# Patient Record
Sex: MALE | Race: WHITE | NOT HISPANIC OR LATINO | Employment: FULL TIME | URBAN - METROPOLITAN AREA
[De-identification: names, ages, dates, MRNs, and addresses within clinical notes are randomized per-mention and may not be internally consistent; named-entity substitution may affect disease eponyms.]

---

## 2017-08-01 ENCOUNTER — OFFICE VISIT (OUTPATIENT)
Dept: URGENT CARE | Facility: CLINIC | Age: 37
End: 2017-08-01
Payer: COMMERCIAL

## 2017-08-01 ENCOUNTER — APPOINTMENT (OUTPATIENT)
Dept: RADIOLOGY | Facility: CLINIC | Age: 37
End: 2017-08-01
Payer: COMMERCIAL

## 2017-08-01 DIAGNOSIS — S99.911A INJURY OF RIGHT ANKLE: ICD-10-CM

## 2017-08-01 PROCEDURE — S9088 SERVICES PROVIDED IN URGENT: HCPCS

## 2017-08-01 PROCEDURE — 73610 X-RAY EXAM OF ANKLE: CPT

## 2017-08-01 PROCEDURE — 99204 OFFICE O/P NEW MOD 45 MIN: CPT

## 2019-05-03 ENCOUNTER — TRANSCRIBE ORDERS (OUTPATIENT)
Dept: ADMINISTRATIVE | Facility: HOSPITAL | Age: 39
End: 2019-05-03

## 2019-05-03 DIAGNOSIS — R53.83 OTHER FATIGUE: ICD-10-CM

## 2019-05-03 DIAGNOSIS — R06.83 SNORING: Primary | ICD-10-CM

## 2019-07-03 ENCOUNTER — TELEPHONE (OUTPATIENT)
Dept: SLEEP CENTER | Facility: CLINIC | Age: 39
End: 2019-07-03

## 2019-07-03 NOTE — TELEPHONE ENCOUNTER
----- Message from Tracy Mendez MD sent at 7/2/2019  1:10 PM EDT -----  Approved  ----- Message -----  From: Enrique Solis MA  Sent: 7/1/2019   1:33 PM EDT  To: Sleep Medicine [place] Provider    This sleep study needs approval      If approved please sign and return to clerical pool  If denied please include reasons why  Also provide alternative testing if warranted  Please sign and return to clerical pool

## 2019-10-30 RX ORDER — ATENOLOL 50 MG/1
1 TABLET ORAL DAILY
COMMUNITY
End: 2019-11-15 | Stop reason: SDUPTHER

## 2019-10-30 RX ORDER — LISINOPRIL AND HYDROCHLOROTHIAZIDE 20; 12.5 MG/1; MG/1
1 TABLET ORAL DAILY
COMMUNITY
End: 2019-11-15 | Stop reason: SDUPTHER

## 2019-10-30 RX ORDER — ROSUVASTATIN CALCIUM 5 MG/1
1 TABLET, COATED ORAL DAILY
COMMUNITY
End: 2019-11-15 | Stop reason: SDUPTHER

## 2019-11-15 ENCOUNTER — OFFICE VISIT (OUTPATIENT)
Dept: FAMILY MEDICINE CLINIC | Facility: CLINIC | Age: 39
End: 2019-11-15
Payer: COMMERCIAL

## 2019-11-15 VITALS
OXYGEN SATURATION: 93 % | BODY MASS INDEX: 46.12 KG/M2 | HEIGHT: 69 IN | HEART RATE: 60 BPM | DIASTOLIC BLOOD PRESSURE: 78 MMHG | WEIGHT: 311.4 LBS | SYSTOLIC BLOOD PRESSURE: 132 MMHG

## 2019-11-15 DIAGNOSIS — I10 ESSENTIAL HYPERTENSION, BENIGN: ICD-10-CM

## 2019-11-15 DIAGNOSIS — G47.30 SLEEP APNEA, UNSPECIFIED TYPE: ICD-10-CM

## 2019-11-15 DIAGNOSIS — Z23 IMMUNIZATION DUE: Primary | ICD-10-CM

## 2019-11-15 DIAGNOSIS — Z85.820 HISTORY OF MELANOMA: ICD-10-CM

## 2019-11-15 DIAGNOSIS — E78.2 MIXED HYPERLIPIDEMIA: ICD-10-CM

## 2019-11-15 PROCEDURE — 99213 OFFICE O/P EST LOW 20 MIN: CPT | Performed by: FAMILY MEDICINE

## 2019-11-15 PROCEDURE — 1036F TOBACCO NON-USER: CPT | Performed by: FAMILY MEDICINE

## 2019-11-15 RX ORDER — EZETIMIBE 10 MG/1
10 TABLET ORAL DAILY
Qty: 30 TABLET | Refills: 5 | Status: SHIPPED | OUTPATIENT
Start: 2019-11-15 | End: 2020-06-01 | Stop reason: SDUPTHER

## 2019-11-15 RX ORDER — LISINOPRIL AND HYDROCHLOROTHIAZIDE 20; 12.5 MG/1; MG/1
1 TABLET ORAL DAILY
Qty: 30 TABLET | Refills: 5 | Status: SHIPPED | OUTPATIENT
Start: 2019-11-15 | End: 2020-06-01 | Stop reason: SDUPTHER

## 2019-11-15 RX ORDER — ROSUVASTATIN CALCIUM 5 MG/1
5 TABLET, COATED ORAL DAILY
Qty: 30 TABLET | Refills: 5 | Status: SHIPPED | OUTPATIENT
Start: 2019-11-15 | End: 2020-05-22 | Stop reason: CLARIF

## 2019-11-15 RX ORDER — ATENOLOL 50 MG/1
50 TABLET ORAL DAILY
Qty: 30 TABLET | Refills: 5 | Status: SHIPPED | OUTPATIENT
Start: 2019-11-15 | End: 2020-06-01 | Stop reason: SDUPTHER

## 2019-11-15 RX ORDER — EZETIMIBE 10 MG/1
10 TABLET ORAL DAILY
Refills: 6 | COMMUNITY
Start: 2019-10-31 | End: 2019-11-15 | Stop reason: SDUPTHER

## 2019-11-15 NOTE — PROGRESS NOTES
Assessment/Plan:    Essential hypertension, benign  Patient has hypertension which is well controlled, despite his morbid obesity  I am going to continue the patient on his current blood pressure regiment  I counseled the patient regarding weight loss  I advised him to follow a sodium restricted diet  He gained 18 lb in the past 6 months  I am very worried about this  Mixed hyperlipidemia  Patient has hyperlipidemia  He has not had blood work done in 2 years now  I gave him a prescription have fasting blood work done when he was here last   He did not have it  He needs to get his blood work done  He plans to go over this weekend  I will contact him with the results  He has history of very difficult to treat hyperlipidemia  I will consider discussing Martell Mason with him    History of melanoma  I examined the patient's back  He has a small pigmented lesion which does not appear to be a melanoma  It is not changed from his last visit 6 months ago  Sleep apnea  I suspect the patient has obstructive sleep apnea  We discussed this  I am going to refer him for a home sleep study  I referred the patient in the past for a polysomnogram but his insurance to refused  Hopefully, they will allow him to have a home study  The patient has clinical symptoms and physical signs of obstructive sleep apnea       Diagnoses and all orders for this visit:    Immunization due  -     influenza vaccine, 2286-5718, quadrivalent, 0 5 mL, preservative-free, for adult and pediatric patients 6 mos+ (AFLURIA, FLUARIX, FLULAVAL, FLUZONE)    Mixed hyperlipidemia  -     Lipid panel; Future  -     Comprehensive metabolic panel; Future  -     ezetimibe (ZETIA) 10 mg tablet; Take 1 tablet (10 mg total) by mouth daily  -     rosuvastatin (CRESTOR) 5 mg tablet; Take 1 tablet (5 mg total) by mouth daily    Sleep apnea, unspecified type  -     Home Study;  Future  -     CBC and differential; Future    Essential hypertension, benign  - atenolol (TENORMIN) 50 mg tablet; Take 1 tablet (50 mg total) by mouth daily  -     lisinopril-hydrochlorothiazide (PRINZIDE,ZESTORETIC) 20-12 5 MG per tablet; Take 1 tablet by mouth daily    History of melanoma    Other orders  -     Discontinue: ezetimibe (ZETIA) 10 mg tablet; Take 10 mg by mouth daily          Subjective:      Patient ID: Griselda Cordoba is a 46 y o  male  This patient is a 81-year-old white male presents to the office for his routine checkup  The patient never got his sleep study done  He tells me it was denied by his insurance  He tells me they denied it because he did not have enough comorbidities  He never got his blood work done  He does not exercise  The following portions of the patient's history were reviewed and updated as appropriate: allergies, current medications, past family history, past medical history, past social history, past surgical history and problem list BMI Counseling: Body mass index is 45 65 kg/m²  The BMI is above normal  Nutrition recommendations include decreasing portion sizes, encouraging healthy choices of fruits and vegetables, decreasing fast food intake, consuming healthier snacks, limiting drinks that contain sugar and moderation in carbohydrate intake  Exercise recommendations include exercising 3-5 times per week  Pharmacotherapy was ordered to help aid in weight loss  Tobacco Cessation Counseling: Tobacco cessation counseling was provided  The patient is sincerely urged to quit consumption of tobacco  He is not ready to quit tobacco  Medication options and side effects of medication not discussed       Review of Systems   Respiratory: Negative for cough, shortness of breath and wheezing  Cardiovascular: Negative for chest pain, palpitations and leg swelling           Objective:      /78 (BP Location: Left arm, Patient Position: Sitting, Cuff Size: Large)   Pulse 60   Ht 5' 9 25" (1 759 m)   Wt (!) 141 kg (311 lb 6 4 oz)   SpO2 93%   BMI 45 65 kg/m²          Physical Exam   Constitutional: He appears well-developed and well-nourished  No distress  HENT:   Head: Normocephalic and atraumatic  Right Ear: External ear normal    Left Ear: External ear normal    Mouth/Throat: Oropharynx is clear and moist  No oropharyngeal exudate  Tympanic membranes are clear  Patient is a Mallampati III  Eyes: Pupils are equal, round, and reactive to light  Conjunctivae are normal  No scleral icterus  Neck: Neck supple  No tracheal deviation present  No thyromegaly present  Neck size is 19-1/2 inches in circumference   Cardiovascular: Normal rate, regular rhythm and normal heart sounds  Exam reveals no gallop and no friction rub  No murmur heard  Pulmonary/Chest: Effort normal and breath sounds normal  No stridor  No respiratory distress  He has no wheezes  He has no rales  Abdominal: Soft  Bowel sounds are normal  He exhibits no distension and no mass  There is no tenderness  There is no rebound and no guarding  Abdomen is obese  There was no organomegaly noted   Lymphadenopathy:     He has no cervical adenopathy  Vitals reviewed      extremities:  Without cyanosis, clubbing, or edema

## 2019-11-15 NOTE — PATIENT INSTRUCTIONS
Low-Sodium Diet   AMBULATORY CARE:   A low-sodium diet  limits foods that are high in sodium (salt)  You will need to follow a low-sodium diet if you have high blood pressure, kidney disease, or heart failure  You may also need to follow this diet if you have a condition that is causing your body to retain (hold) extra fluid  You may need to limit the amount of sodium you eat to 1,500 mg  Ask your healthcare provider how much sodium you can have each day  How to use food labels to choose foods that are low in sodium:  Read food labels to find the amount of sodium they contain  The amount of sodium is listed in milligrams (mg)  The % Daily Value (DV) column tells you how much of your daily needs are met by 1 serving of the food for each nutrient listed  Choose foods that have less than 5% of the DV of sodium  These foods are considered low in sodium  Foods that have 20% or more of the DV of sodium are considered high in sodium  Some food labels may also list any of the following terms that tell you about the sodium content in the food:  · Sodium-free:  Less than 5 mg in each serving    · Very low sodium:  35 mg of sodium or less in each serving    · Low sodium:  140 mg of sodium or less in each serving    · Reduced sodium: At least 25% less sodium in each serving than the regular type    · Light in sodium:  50% less sodium in each serving    · Unsalted or no added salt:  No extra salt is added during processing (the food may still contain sodium)  Foods to avoid:  Salty foods are high in sodium   You should avoid the following:  · Processed foods:      ¨ Mixes for cornbread, biscuits, cake, and pudding     ¨ Instant foods, such as potatoes, cereals, noodles, and rice     ¨ Packaged foods, such as bread stuffing, rice and pasta mixes, snack dip mixes, and macaroni and cheese     ¨ Canned foods, such as canned vegetables, soups, broths, sauces, and vegetable or tomato juice    ¨ Snack foods, such as salted chips, popcorn, pretzels, pork rinds, salted crackers, and salted nuts    ¨ Frozen foods, such as dinners, entrees, vegetables with sauces, and breaded meats    ¨ Sauerkraut, pickled vegetables, and other foods prepared in brine    · Meats and cheeses:      ¨ Smoked or cured meat, such as corned beef, meraz, ham, hot dogs, and sausage    ¨ Canned meats or spreads, such as potted meats, sardines, anchovies, and imitation seafood    ¨ Deli or lunch meats, such as bologna, ham, turkey, and roast beef    ¨ Processed cheese, such as American cheese and cheese spreads    · Condiments, sauces, and seasonings:      ¨ Salt (¼ teaspoon of salt contains 575 mg of sodium)    ¨ Seasonings made with salt, such as garlic salt, celery salt, onion salt, and seasoned salt    ¨ Regular soy sauce, barbecue sauce, teriyaki sauce, steak sauce, Worcestershire sauce, and most flavored vinegars    ¨ Canned gravy and mixes     ¨ Regular condiments, such as mustard, ketchup, and salad dressings    ¨ Pickles and olives    ¨ Meat tenderizers and monosodium glutamate (MSG)  Foods to include:  Read the food label to find the exact amount of sodium in each serving  · Bread and cereal:  Try to choose breads with less than 80 mg of sodium per serving  ¨ Bread, roll, ibeth, tortilla, or unsalted crackers  ¨ Ready-to-eat cereals with less than 5% DV of sodium (examples include shredded wheat and puffed rice)    ¨ Pasta    · Vegetables and fruits:      ¨ Unsalted fresh, frozen, or canned vegetables    ¨ Fresh, frozen, or canned fruits    ¨ Fruit juice    · Dairy:  One serving has about 150 mg of sodium  ¨ Milk, all types    ¨ Yogurt    ¨ Hard cheese, such as cheddar, Swiss, [employer], or mozzarella    · Meat and other protein foods:  Some raw meats may have added sodium       ¨ Plain meats, fish, and poultry     ¨ Eggs    · Other foods:      ¨ Homemade pudding    ¨ Unsalted nuts, popcorn, or pretzels    ¨ Unsalted butter or margarine  Ways to decrease sodium:   · Add spices and herbs to foods instead of salt during cooking  Use salt-free seasonings to add flavor to foods  Examples include onion powder, garlic powder, basil, engel powder, paprika, and parsley  Try lemon or lime juice or vinegar to give foods a tart flavor  Use hot peppers, pepper, or cayenne pepper to add a spicy flavor to foods  · Do not keep a salt shaker at your kitchen table  This may help keep you from adding salt to food at the table  It may take time to get used to enjoying the natural flavor of food instead of adding salt  Talk to your healthcare provider before you use salt substitutes  Some salt substitutes have a high amount of potassium and need to be avoided if you have kidney disease  · Choose low-sodium foods at restaurants  Meals from restaurants are often high in sodium  Some restaurants have nutrition information on the menu that tells you the amount of sodium in their foods  If possible, ask for your food to be prepared with less, or no salt  · Shop for unsalted or low-sodium foods and snacks at the grocery store  Examples include unsalted or low-sodium broths, soups, and canned vegetables  Choose fresh or frozen vegetables instead  Choose unsalted nuts or seeds or fresh fruits or vegetables as snacks  Read food labels and choose salt-free, very low-sodium, or low-sodium foods  © 2017 [address] Information is for End User's use only and may not be sold, redistributed or otherwise used for commercial purposes  All illustrations and images included in CareNotes® are the copyrighted property of A D A M [employer] Inc  or [employer] Bliss  The above information is an  only  It is not intended as medical advice for individual conditions or treatments  Talk to your doctor, nurse or pharmacist before following any medical regimen to see if it is safe and effective for you

## 2019-11-15 NOTE — ASSESSMENT & PLAN NOTE
Patient has hypertension which is well controlled, despite his morbid obesity  I am going to continue the patient on his current blood pressure regiment  I counseled the patient regarding weight loss  I advised him to follow a sodium restricted diet  He gained 18 lb in the past 6 months  I am very worried about this

## 2019-11-15 NOTE — ASSESSMENT & PLAN NOTE
I examined the patient's back  He has a small pigmented lesion which does not appear to be a melanoma  It is not changed from his last visit 6 months ago

## 2019-11-15 NOTE — ASSESSMENT & PLAN NOTE
Patient has hyperlipidemia  He has not had blood work done in 2 years now  I gave him a prescription have fasting blood work done when he was here last   He did not have it  He needs to get his blood work done  He plans to go over this weekend  I will contact him with the results  He has history of very difficult to treat hyperlipidemia    I will consider discussing Ketty Miner with him

## 2019-11-15 NOTE — ASSESSMENT & PLAN NOTE
I suspect the patient has obstructive sleep apnea  We discussed this  I am going to refer him for a home sleep study  I referred the patient in the past for a polysomnogram but his insurance to refused  Hopefully, they will allow him to have a home study    The patient has clinical symptoms and physical signs of obstructive sleep apnea

## 2019-11-19 PROCEDURE — 90471 IMMUNIZATION ADMIN: CPT | Performed by: FAMILY MEDICINE

## 2019-11-19 PROCEDURE — 90686 IIV4 VACC NO PRSV 0.5 ML IM: CPT | Performed by: FAMILY MEDICINE

## 2019-12-02 DIAGNOSIS — E78.2 MIXED HYPERLIPIDEMIA: Primary | ICD-10-CM

## 2019-12-02 RX ORDER — ROSUVASTATIN CALCIUM 40 MG/1
TABLET, COATED ORAL
Qty: 30 TABLET | Refills: 6 | Status: SHIPPED | OUTPATIENT
Start: 2019-12-02 | End: 2020-05-22 | Stop reason: SDUPTHER

## 2020-03-04 ENCOUNTER — TELEPHONE (OUTPATIENT)
Dept: SLEEP CENTER | Facility: CLINIC | Age: 40
End: 2020-03-04

## 2020-04-10 ENCOUNTER — TELEPHONE (OUTPATIENT)
Dept: FAMILY MEDICINE CLINIC | Facility: CLINIC | Age: 40
End: 2020-04-10
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
## 2020-04-10 RX ORDER — DOXYCYCLINE HYCLATE 100 MG/1
CAPSULE ORAL
Qty: 2 CAPSULE | Refills: 0 | Status: SHIPPED | OUTPATIENT
Start: 2020-04-10 | End: 2020-04-11

## 2020-05-22 ENCOUNTER — OFFICE VISIT (OUTPATIENT)
Dept: FAMILY MEDICINE CLINIC | Facility: CLINIC | Age: 40
End: 2020-05-22
Payer: COMMERCIAL

## 2020-05-22 VITALS
TEMPERATURE: 98.6 F | BODY MASS INDEX: 45.88 KG/M2 | WEIGHT: 309.8 LBS | OXYGEN SATURATION: 98 % | HEIGHT: 69 IN | DIASTOLIC BLOOD PRESSURE: 78 MMHG | SYSTOLIC BLOOD PRESSURE: 128 MMHG | HEART RATE: 72 BPM

## 2020-05-22 DIAGNOSIS — E78.2 MIXED HYPERLIPIDEMIA: ICD-10-CM

## 2020-05-22 DIAGNOSIS — I10 ESSENTIAL HYPERTENSION, BENIGN: Primary | ICD-10-CM

## 2020-05-22 PROCEDURE — 99213 OFFICE O/P EST LOW 20 MIN: CPT | Performed by: FAMILY MEDICINE

## 2020-05-22 PROCEDURE — 3008F BODY MASS INDEX DOCD: CPT | Performed by: FAMILY MEDICINE

## 2020-05-22 PROCEDURE — 4004F PT TOBACCO SCREEN RCVD TLK: CPT | Performed by: FAMILY MEDICINE

## 2020-05-22 PROCEDURE — 3078F DIAST BP <80 MM HG: CPT | Performed by: FAMILY MEDICINE

## 2020-05-22 PROCEDURE — 3074F SYST BP LT 130 MM HG: CPT | Performed by: FAMILY MEDICINE

## 2020-05-22 RX ORDER — ROSUVASTATIN CALCIUM 40 MG/1
40 TABLET, COATED ORAL
Qty: 90 TABLET | Refills: 2 | Status: SHIPPED | OUTPATIENT
Start: 2020-05-22 | End: 2020-11-23 | Stop reason: SDUPTHER

## 2020-06-01 DIAGNOSIS — E78.2 MIXED HYPERLIPIDEMIA: ICD-10-CM

## 2020-06-01 DIAGNOSIS — I10 ESSENTIAL HYPERTENSION, BENIGN: ICD-10-CM

## 2020-06-01 RX ORDER — LISINOPRIL AND HYDROCHLOROTHIAZIDE 20; 12.5 MG/1; MG/1
1 TABLET ORAL DAILY
Qty: 30 TABLET | Refills: 5 | Status: SHIPPED | OUTPATIENT
Start: 2020-06-01 | End: 2020-11-23 | Stop reason: SDUPTHER

## 2020-06-01 RX ORDER — ATENOLOL 50 MG/1
50 TABLET ORAL DAILY
Qty: 30 TABLET | Refills: 5 | Status: SHIPPED | OUTPATIENT
Start: 2020-06-01 | End: 2020-11-23 | Stop reason: SDUPTHER

## 2020-06-01 RX ORDER — EZETIMIBE 10 MG/1
10 TABLET ORAL DAILY
Qty: 30 TABLET | Refills: 5 | Status: SHIPPED | OUTPATIENT
Start: 2020-06-01 | End: 2020-11-23 | Stop reason: SDUPTHER

## 2020-11-23 ENCOUNTER — OFFICE VISIT (OUTPATIENT)
Dept: FAMILY MEDICINE CLINIC | Facility: CLINIC | Age: 40
End: 2020-11-23
Payer: COMMERCIAL

## 2020-11-23 VITALS
WEIGHT: 315 LBS | BODY MASS INDEX: 46.65 KG/M2 | HEART RATE: 77 BPM | SYSTOLIC BLOOD PRESSURE: 126 MMHG | OXYGEN SATURATION: 95 % | TEMPERATURE: 98.5 F | DIASTOLIC BLOOD PRESSURE: 82 MMHG | HEIGHT: 69 IN

## 2020-11-23 DIAGNOSIS — I10 ESSENTIAL HYPERTENSION, BENIGN: ICD-10-CM

## 2020-11-23 DIAGNOSIS — E66.01 MORBID OBESITY WITH BMI OF 45.0-49.9, ADULT (HCC): ICD-10-CM

## 2020-11-23 DIAGNOSIS — E78.2 MIXED HYPERLIPIDEMIA: ICD-10-CM

## 2020-11-23 DIAGNOSIS — M72.2 PLANTAR FASCIITIS OF LEFT FOOT: ICD-10-CM

## 2020-11-23 DIAGNOSIS — Z23 IMMUNIZATION DUE: Primary | ICD-10-CM

## 2020-11-23 PROCEDURE — 3079F DIAST BP 80-89 MM HG: CPT | Performed by: FAMILY MEDICINE

## 2020-11-23 PROCEDURE — 3008F BODY MASS INDEX DOCD: CPT | Performed by: FAMILY MEDICINE

## 2020-11-23 PROCEDURE — 3074F SYST BP LT 130 MM HG: CPT | Performed by: FAMILY MEDICINE

## 2020-11-23 PROCEDURE — 99213 OFFICE O/P EST LOW 20 MIN: CPT | Performed by: FAMILY MEDICINE

## 2020-11-23 PROCEDURE — 1036F TOBACCO NON-USER: CPT | Performed by: FAMILY MEDICINE

## 2020-11-23 PROCEDURE — 90471 IMMUNIZATION ADMIN: CPT

## 2020-11-23 PROCEDURE — 90686 IIV4 VACC NO PRSV 0.5 ML IM: CPT

## 2020-11-23 RX ORDER — ROSUVASTATIN CALCIUM 40 MG/1
40 TABLET, COATED ORAL
Qty: 30 TABLET | Refills: 5 | Status: SHIPPED | OUTPATIENT
Start: 2020-11-23 | End: 2021-06-02 | Stop reason: SDUPTHER

## 2020-11-23 RX ORDER — LISINOPRIL AND HYDROCHLOROTHIAZIDE 20; 12.5 MG/1; MG/1
1 TABLET ORAL DAILY
Qty: 30 TABLET | Refills: 5 | Status: SHIPPED | OUTPATIENT
Start: 2020-11-23 | End: 2021-06-02 | Stop reason: SDUPTHER

## 2020-11-23 RX ORDER — ATENOLOL 50 MG/1
50 TABLET ORAL DAILY
Qty: 30 TABLET | Refills: 5 | Status: SHIPPED | OUTPATIENT
Start: 2020-11-23 | End: 2021-06-02 | Stop reason: SDUPTHER

## 2020-11-23 RX ORDER — EZETIMIBE 10 MG/1
10 TABLET ORAL DAILY
Qty: 30 TABLET | Refills: 5 | Status: SHIPPED | OUTPATIENT
Start: 2020-11-23 | End: 2021-06-02 | Stop reason: SDUPTHER

## 2021-03-11 ENCOUNTER — IMMUNIZATIONS (OUTPATIENT)
Dept: FAMILY MEDICINE CLINIC | Facility: HOSPITAL | Age: 41
End: 2021-03-11

## 2021-03-11 DIAGNOSIS — Z23 ENCOUNTER FOR IMMUNIZATION: Primary | ICD-10-CM

## 2021-03-11 PROCEDURE — 0011A SARS-COV-2 / COVID-19 MRNA VACCINE (MODERNA) 100 MCG: CPT

## 2021-03-11 PROCEDURE — 91301 SARS-COV-2 / COVID-19 MRNA VACCINE (MODERNA) 100 MCG: CPT

## 2021-04-08 ENCOUNTER — IMMUNIZATIONS (OUTPATIENT)
Dept: FAMILY MEDICINE CLINIC | Facility: HOSPITAL | Age: 41
End: 2021-04-08

## 2021-04-08 DIAGNOSIS — Z23 ENCOUNTER FOR IMMUNIZATION: Primary | ICD-10-CM

## 2021-04-08 PROCEDURE — 0012A SARS-COV-2 / COVID-19 MRNA VACCINE (MODERNA) 100 MCG: CPT

## 2021-04-08 PROCEDURE — 91301 SARS-COV-2 / COVID-19 MRNA VACCINE (MODERNA) 100 MCG: CPT

## 2021-06-02 DIAGNOSIS — I10 ESSENTIAL HYPERTENSION, BENIGN: ICD-10-CM

## 2021-06-02 DIAGNOSIS — E78.2 MIXED HYPERLIPIDEMIA: ICD-10-CM

## 2021-06-02 RX ORDER — ATENOLOL 50 MG/1
50 TABLET ORAL DAILY
Qty: 90 TABLET | Refills: 3 | Status: SHIPPED | OUTPATIENT
Start: 2021-06-02 | End: 2022-07-07 | Stop reason: SDUPTHER

## 2021-06-02 RX ORDER — EZETIMIBE 10 MG/1
10 TABLET ORAL DAILY
Qty: 90 TABLET | Refills: 3 | Status: SHIPPED | OUTPATIENT
Start: 2021-06-02 | End: 2022-07-07 | Stop reason: SDUPTHER

## 2021-06-02 RX ORDER — ROSUVASTATIN CALCIUM 40 MG/1
40 TABLET, COATED ORAL
Qty: 90 TABLET | Refills: 3 | Status: SHIPPED | OUTPATIENT
Start: 2021-06-02 | End: 2022-07-07 | Stop reason: SDUPTHER

## 2021-06-02 RX ORDER — LISINOPRIL AND HYDROCHLOROTHIAZIDE 20; 12.5 MG/1; MG/1
1 TABLET ORAL DAILY
Qty: 90 TABLET | Refills: 3 | Status: SHIPPED | OUTPATIENT
Start: 2021-06-02 | End: 2022-07-07 | Stop reason: SDUPTHER

## 2021-06-09 ENCOUNTER — OFFICE VISIT (OUTPATIENT)
Dept: FAMILY MEDICINE CLINIC | Facility: CLINIC | Age: 41
End: 2021-06-09
Payer: COMMERCIAL

## 2021-06-09 VITALS
BODY MASS INDEX: 46.65 KG/M2 | WEIGHT: 315 LBS | OXYGEN SATURATION: 95 % | DIASTOLIC BLOOD PRESSURE: 82 MMHG | SYSTOLIC BLOOD PRESSURE: 138 MMHG | HEIGHT: 69 IN | TEMPERATURE: 97.9 F | HEART RATE: 73 BPM

## 2021-06-09 DIAGNOSIS — E78.2 MIXED HYPERLIPIDEMIA: Primary | ICD-10-CM

## 2021-06-09 DIAGNOSIS — R53.83 OTHER FATIGUE: ICD-10-CM

## 2021-06-09 DIAGNOSIS — E66.01 MORBID OBESITY WITH BMI OF 45.0-49.9, ADULT (HCC): ICD-10-CM

## 2021-06-09 DIAGNOSIS — I10 ESSENTIAL HYPERTENSION, BENIGN: ICD-10-CM

## 2021-06-09 PROCEDURE — 1036F TOBACCO NON-USER: CPT | Performed by: FAMILY MEDICINE

## 2021-06-09 PROCEDURE — 99213 OFFICE O/P EST LOW 20 MIN: CPT | Performed by: FAMILY MEDICINE

## 2021-06-09 PROCEDURE — 3075F SYST BP GE 130 - 139MM HG: CPT | Performed by: FAMILY MEDICINE

## 2021-06-09 PROCEDURE — 3725F SCREEN DEPRESSION PERFORMED: CPT | Performed by: FAMILY MEDICINE

## 2021-06-09 PROCEDURE — 3079F DIAST BP 80-89 MM HG: CPT | Performed by: FAMILY MEDICINE

## 2021-06-09 PROCEDURE — 3008F BODY MASS INDEX DOCD: CPT | Performed by: FAMILY MEDICINE

## 2021-06-09 NOTE — PROGRESS NOTES
Assessment/Plan:    Essential hypertension, benign   Patient has hypertension  His blood pressure is reasonably well controlled  I recheck his blood pressure and found to be 134/80  I encouraged the patient to follow a low-salt diet  He needs to lose weight  He still has not gotten his blood work done that I ordered in November  I reordered the blood work today  He needs to get it done as soon as possible  He has not had his blood work done now in several years  With his blood pressure medication, I must be able to ensure that his potassium, BUN and creatinine are okay  Mixed hyperlipidemia    Patient has hyperlipidemia  He reports that his to children have hyperlipidemia and are now taking Lipitor  When diagnose, other cholesterol is were 400  Patient has had difficult to control hyperlipidemia  He is currently on statin as well as at he a  Unfortunately, he has not had his labs done in quite a long period of time and I have no idea what he is running  I reordered his labs and encouraged him to get them done as soon as possible  Diagnoses and all orders for this visit:    Mixed hyperlipidemia  -     Lipid Panel with Direct LDL reflex; Future    Essential hypertension, benign  -     Comprehensive metabolic panel; Future    Other fatigue  -     TSH, 3rd generation with Free T4 reflex; Future  -     CBC and differential; Future    Morbid obesity with BMI of 45 0-49 9, adult ([facility] [address] )          I encouraged the patient to make sure he wear sunscreen this summer and a wide brimmed hat  He should continue to check his skin regularly for any changing moles or new skin lesions    Subjective:      Patient ID: Thaddeus Marlow is a 37 y o  male  This patient is a 71-year-old white male presents to the office today for his routine checkup  The patient is doing well and has no complaints  The patient reports compliance with his medication    Unfortunately, he continues to struggle with his weight  He still has not gotten blood work  I had reordered his blood work last time  I been reordering his blood work every time I see him but he has not been getting it done  He has not had his blood work done for several years  The following portions of the patient's history were reviewed and updated as appropriate: allergies, current medications, past family history, past medical history, past social history, past surgical history and problem list     Review of Systems   Constitutional: Negative for activity change, appetite change and unexpected weight change  Respiratory: Negative for cough, shortness of breath and wheezing  Cardiovascular: Negative for chest pain, palpitations and leg swelling  Gastrointestinal: Negative for abdominal distention, abdominal pain, blood in stool, constipation, diarrhea and nausea  Skin: Negative for color change  Objective:      /82 (BP Location: Left arm, Patient Position: Sitting, Cuff Size: Large)   Pulse 73   Temp 97 9 °F (36 6 °C) (Temporal)   Ht 5' 9" (1 753 m)   Wt (!) 143 kg (315 lb 14 4 oz)   SpO2 95%   BMI 46 65 kg/m²          Physical Exam  Vitals signs reviewed  Constitutional:       Comments: This patient is a 52-year-old white male who appears his stated age  He is morbidly obese and in no apparent distress   HENT:      Head: Normocephalic and atraumatic  Right Ear: Tympanic membrane, ear canal and external ear normal  There is no impacted cerumen  Left Ear: Tympanic membrane, ear canal and external ear normal  There is no impacted cerumen  Mouth/Throat:      Mouth: Mucous membranes are moist       Pharynx: Oropharynx is clear  No oropharyngeal exudate or posterior oropharyngeal erythema  Eyes:      General: No scleral icterus  Right eye: No discharge  Left eye: No discharge  Conjunctiva/sclera: Conjunctivae normal       Pupils: Pupils are equal, round, and reactive to light     Neck: Musculoskeletal: Neck supple  Comments:  No thyromegaly was noted  Cardiovascular:      Rate and Rhythm: Normal rate and regular rhythm  Heart sounds: Normal heart sounds  No murmur  No gallop  Pulmonary:      Effort: No respiratory distress  Breath sounds: Normal breath sounds  No stridor  No wheezing, rhonchi or rales  Abdominal:      General: Bowel sounds are normal  There is no distension  Palpations: Abdomen is soft  There is no mass  Tenderness: There is no abdominal tenderness  There is no guarding  Hernia: No hernia is present  Comments:  Abdomen is obese  There was no hepatosplenomegaly   Lymphadenopathy:      Cervical: No cervical adenopathy  Psychiatric:         Mood and Affect: Mood normal          Behavior: Behavior normal          Thought Content:  Thought content normal          Judgment: Judgment normal         Extremities: Without cyanosis, clubbing, or edema

## 2021-06-09 NOTE — ASSESSMENT & PLAN NOTE
Patient has hyperlipidemia  He reports that his to children have hyperlipidemia and are now taking Lipitor  When diagnose, other cholesterol is were 400  Patient has had difficult to control hyperlipidemia  He is currently on statin as well as at he a  Unfortunately, he has not had his labs done in quite a long period of time and I have no idea what he is running  I reordered his labs and encouraged him to get them done as soon as possible

## 2021-06-09 NOTE — PATIENT INSTRUCTIONS
Plantar Fasciitis   AMBULATORY CARE:   Plantar fasciitis  is swelling of the plantar fascia  The plantar fascia is a band of fibers that connect your heel bone to the front of your foot  It helps support the arch of your foot and absorbs shock  Plantar fasciitis is caused by small tears in the plantar fascia  Over time, the tears cause swelling and irritation  Signs and symptoms:   · Pain near your heel, especially first thing in the morning    · Pain with prolonged standing, sitting, or walking    · Redness, swelling, or warmth over the injured part of your foot    Contact your healthcare provider or podiatrist if:   · Your pain and swelling increase  · You develop knee, hip, or back pain  · You have questions or concerns about your condition or care  Treatment  may include any of the following:  · Medicines  may be given to decrease swelling and pain  Steroids may be injected into your heel to decrease swelling and pain  · Shoe inserts, splints, or tape  help support your foot and decrease stress on your plantar fascia  A night splint may help stretch your plantar fascia while you sleep  · Stretches and exercises  can help decrease pain and swelling  They can also help strengthen the muscles that support your heel and foot  · Extracorporeal shockwave therapy (ESWT)  uses sound waves to decrease swelling of the plantar fascia  ESWT may be done if other treatments do not work  · Surgery  is rarely needed to separate the plantar fascia from your heel  Self-care:   · Wear your splint or shoe inserts as directed  You may need to wear a splint at night to keep your foot stretched while you sleep  This will help prevent sharp pain first thing in the morning  Shoe inserts will help decrease stress on your plantar fascia when you walk or exercise  · Rest as directed  Rest as much as possible to decrease swelling and prevent more damage   Ask your healthcare provider when you can return to your normal activities  · Apply ice on your plantar fascia  Ice helps prevent tissue damage and decreases swelling and pain  Fill a water bottle with water and freeze it  Wrap a towel around the bottle or cover it with a pillow case  Roll the water bottle under your foot for 10 minutes in the morning and after work  · Massage your plantar fascia as directed  This may help decrease swelling and pain  Roll a golf ball under your foot for 10 minutes  Repeat 3 times each day  · Go to physical therapy as directed  A physical therapist teaches you exercises to help improve movement and strength, and to decrease pain  Prevent plantar fasciitis:   · Maintain a healthy weight  This will help decrease stress on your feet  Ask your healthcare provider how much you should weigh  Ask him to help you create a weight loss plan if you are overweight  · Do low-impact exercises  Low-impact exercises decrease stress on your plantar fascia  Examples include swimming or bicycling  · Start new activities slowly  Increase the intensity and time gradually  · Wear shoes that fit well and support your arch  Replace your shoes before the padding or shock absorption wears out  Do not walk or  bare feet or sandals for long periods of time  · Stretch before you exercise  Ask your healthcare provider how to stretch your plantar fascia and calf muscles  Follow up with your healthcare provider or podiatrist as directed:  Write down your questions so you remember to ask them during your visits  © Copyright [address] Information is for End User's use only and may not be sold, redistributed or otherwise used for commercial purposes  All illustrations and images included in CareNotes® are the copyrighted property of [employer] D [employer] M , Inc  or [address] [place] [address] Dee   The above information is an  only  It is not intended as medical advice for individual conditions or treatments   Talk to your doctor, nurse or pharmacist before following any medical regimen to see if it is safe and effective for you

## 2021-06-09 NOTE — ASSESSMENT & PLAN NOTE
Patient has hypertension  His blood pressure is reasonably well controlled  I recheck his blood pressure and found to be 134/80  I encouraged the patient to follow a low-salt diet  He needs to lose weight  He still has not gotten his blood work done that I ordered in November  I reordered the blood work today  He needs to get it done as soon as possible  He has not had his blood work done now in several years  With his blood pressure medication, I must be able to ensure that his potassium, BUN and creatinine are okay

## 2021-11-02 ENCOUNTER — TELEPHONE (OUTPATIENT)
Dept: FAMILY MEDICINE CLINIC | Facility: CLINIC | Age: 41
End: 2021-11-02

## 2021-11-02 DIAGNOSIS — Z20.822 EXPOSURE TO COVID-19 VIRUS: Primary | ICD-10-CM

## 2021-11-02 PROCEDURE — U0005 INFEC AGEN DETEC AMPLI PROBE: HCPCS | Performed by: FAMILY MEDICINE

## 2021-11-02 PROCEDURE — U0003 INFECTIOUS AGENT DETECTION BY NUCLEIC ACID (DNA OR RNA); SEVERE ACUTE RESPIRATORY SYNDROME CORONAVIRUS 2 (SARS-COV-2) (CORONAVIRUS DISEASE [COVID-19]), AMPLIFIED PROBE TECHNIQUE, MAKING USE OF HIGH THROUGHPUT TECHNOLOGIES AS DESCRIBED BY CMS-2020-01-R: HCPCS | Performed by: FAMILY MEDICINE

## 2021-11-05 ENCOUNTER — TELEPHONE (OUTPATIENT)
Dept: FAMILY MEDICINE CLINIC | Facility: CLINIC | Age: 41
End: 2021-11-05

## 2021-11-08 ENCOUNTER — TELEMEDICINE (OUTPATIENT)
Dept: FAMILY MEDICINE CLINIC | Facility: CLINIC | Age: 41
End: 2021-11-08
Payer: COMMERCIAL

## 2021-11-08 VITALS — TEMPERATURE: 99 F | WEIGHT: 310 LBS | BODY MASS INDEX: 45.91 KG/M2 | HEIGHT: 69 IN

## 2021-11-08 DIAGNOSIS — U07.1 COVID-19 VIRUS INFECTION: Primary | ICD-10-CM

## 2021-11-08 PROCEDURE — 1036F TOBACCO NON-USER: CPT | Performed by: FAMILY MEDICINE

## 2021-11-08 PROCEDURE — 99213 OFFICE O/P EST LOW 20 MIN: CPT | Performed by: FAMILY MEDICINE

## 2021-11-12 ENCOUNTER — TELEMEDICINE (OUTPATIENT)
Dept: FAMILY MEDICINE CLINIC | Facility: CLINIC | Age: 41
End: 2021-11-12
Payer: COMMERCIAL

## 2021-11-12 VITALS — BODY MASS INDEX: 45.91 KG/M2 | WEIGHT: 310 LBS | HEIGHT: 69 IN

## 2021-11-12 DIAGNOSIS — U07.1 COVID-19 VIRUS INFECTION: Primary | ICD-10-CM

## 2021-11-12 PROCEDURE — 99213 OFFICE O/P EST LOW 20 MIN: CPT | Performed by: FAMILY MEDICINE

## 2021-11-12 PROCEDURE — 3008F BODY MASS INDEX DOCD: CPT | Performed by: FAMILY MEDICINE

## 2021-11-12 NOTE — ASSESSMENT & PLAN NOTE
Patient has COVID-19 virus infection  Today is day #9  The patient reports no fever since Tuesday, November 9  Patient is going to remain at home in quarantine  I told him he can discontinue quarantine on Sunday, November 14, provided he continues to remain afebrile  I expect he should  His pulse ox is normal   He is completely asymptomatic at this point  We discussed the return to work  I am going to clear him to return to work on Monday, November 15 without restriction  The patient did ask what he should do if he has another exposure to Stephanie  I told the patient that if, in the next 90 day period of time, he is exposed to another individual with COVID, he does not need to quarantine and he does not need to be retested  After 90 days, he would need to be tested if he becomes symptomatic

## 2021-11-12 NOTE — PROGRESS NOTES
COVID-19 Outpatient Progress Note    Assessment/Plan:    Problem List Items Addressed This Visit        Other    COVID-19 virus infection - Primary       Patient has COVID-19 virus infection  Today is day #9  The patient reports no fever since Tuesday, November 9  Patient is going to remain at home in quarantine  I told him he can discontinue quarantine on Sunday, November 14, provided he continues to remain afebrile  I expect he should  His pulse ox is normal   He is completely asymptomatic at this point  We discussed the return to work  I am going to clear him to return to work on Monday, November 15 without restriction  The patient did ask what he should do if he has another exposure to Stephanie  I told the patient that if, in the next 90 day period of time, he is exposed to another individual with COVID, he does not need to quarantine and he does not need to be retested  After 90 days, he would need to be tested if he becomes symptomatic  Disposition:     I recommended continued isolation until at least 24 hours have passed since recovery defined as resolution of fever without the use of fever-reducing medications AND improvement in COVID symptoms AND 10 days have passed since onset of symptoms (or 10 days have passed since date of first positive viral diagnostic test for asymptomatic patients)  I have spent 16 minutes directly with the patient  Greater than 50% of this time was spent in counseling/coordination of care regarding: prognosis, instructions for management, patient and family education and impressions  Verification of patient location:    Patient is located in the following state in which I hold an active license [place]    Encounter provider Margaret Martinez DO    Provider located at [address] [place] [address] [address] [id number] [phone]    Recent Visits  No visits were found meeting these conditions    Showing recent visits within past 7 days and meeting all other requirements  Future Appointments  No visits were found meeting these conditions  Showing future appointments within next 150 days and meeting all other requirements     This virtual check-in was done via [address] and patient was informed that this is a secure, HIPAA-compliant platform  He agrees to proceed  Patient agrees to participate in a virtual check in via telephone or video visit instead of presenting to the office to address urgent/immediate medical needs  Patient is aware this is a billable service  After connecting through [facility], the patient was identified by name and date of birth  Steffany Deluca was informed that this was a telemedicine visit and that the exam was being conducted confidentially over secure lines  My office door was closed  No one else was in the room  Steffany Deluca acknowledged consent and understanding of privacy and security of the telemedicine visit  I informed the patient that I have reviewed his record in Epic and presented the opportunity for him to ask any questions regarding the visit today  The patient agreed to participate  Subjective: Steffany Deluca is a 37 y o  male who has been screened for COVID-19  Symptom change since last report: resolving  Patient denies fever, chills, fatigue, malaise, congestion, rhinorrhea, sore throat, anosmia, loss of taste, cough, shortness of breath, chest tightness, abdominal pain, nausea, vomiting, diarrhea, myalgias and headaches  Date of symptom onset: 11/3/2021  Date of positive COVID-19 PCR: 11/2/2021  COVID-19 vaccination status: Fully vaccinated    Jada Boyd has been staying home and has isolated themselves in his home  He is taking care to not share personal items and is cleaning all surfaces that are touched often, like counters, tabletops, and doorknobs using household cleaning sprays or wipes   He is wearing a mask when he leaves his room  Lab Results   Component Value Date    SARSCOV2 Positive (A) 11/02/2021     Past Medical History:   Diagnosis Date    Cancer ([address] )     skin    Coronary artery disease     Hyperlipidemia     Hypertension      Past Surgical History:   Procedure Laterality Date    ANTERIOR CRUCIATE LIGAMENT REPAIR Left     APPENDECTOMY      CARDIAC CATHETERIZATION       Current Outpatient Medications   Medication Sig Dispense Refill    atenolol (TENORMIN) 50 mg tablet Take 1 tablet (50 mg total) by mouth daily 90 tablet 3    ezetimibe (ZETIA) 10 mg tablet Take 1 tablet (10 mg total) by mouth daily 90 tablet 3    lisinopril-hydrochlorothiazide (PRINZIDE,ZESTORETIC) 20-12 5 MG per tablet Take 1 tablet by mouth daily 90 tablet 3    rosuvastatin (CRESTOR) 40 MG tablet Take 1 tablet (40 mg total) by mouth daily at bedtime 90 tablet 3     No current facility-administered medications for this visit  No Known Allergies    Review of Systems   Constitutional: Negative for chills, fatigue and fever  HENT: Negative for congestion, rhinorrhea and sore throat  Respiratory: Negative for cough, chest tightness and shortness of breath  Gastrointestinal: Negative for abdominal pain, diarrhea, nausea and vomiting  Musculoskeletal: Negative for myalgias  Neurological: Negative for headaches  Objective:    Vitals:    11/12/21 1310   Weight: (!) 141 kg (310 lb)   Height: 5' 9" (1 753 m)       Physical Exam  Vitals reviewed  Constitutional:       Comments: This is an obese 61-year-old white male who appears his stated age  He is nonseptic in appearance and in no apparent distress   HENT:      Head: Normocephalic and atraumatic  Right Ear: External ear normal       Left Ear: External ear normal       Mouth/Throat:      Mouth: Mucous membranes are moist       Pharynx: Oropharynx is clear  No oropharyngeal exudate or posterior oropharyngeal erythema  Eyes:      General: No scleral icterus  Right eye: No discharge  Left eye: No discharge  Conjunctiva/sclera: Conjunctivae normal    Cardiovascular:      Comments: There is no cyanosis or clubbing of the digits  Pulmonary:      Comments: Patient did not cough  He was not tachypneic  He did not show any signs of respiratory distress  Lymphadenopathy:      Cervical: No cervical adenopathy  VIRTUAL VISIT DISCLAIMER    Kalani Reynolds verbally agrees to participate in [employer]  Pt is aware that [employer] could be limited without vital signs or the ability to perform a full hands-on physical exam  Prashant Small understands he or the provider may request at any time to terminate the video visit and request the patient to seek care or treatment in person

## 2022-07-07 ENCOUNTER — TELEPHONE (OUTPATIENT)
Dept: FAMILY MEDICINE CLINIC | Facility: CLINIC | Age: 42
End: 2022-07-07

## 2022-07-07 DIAGNOSIS — E78.2 MIXED HYPERLIPIDEMIA: ICD-10-CM

## 2022-07-07 DIAGNOSIS — I10 ESSENTIAL HYPERTENSION, BENIGN: ICD-10-CM

## 2022-07-07 RX ORDER — EZETIMIBE 10 MG/1
10 TABLET ORAL DAILY
Qty: 30 TABLET | Refills: 0 | Status: SHIPPED | OUTPATIENT
Start: 2022-07-07 | End: 2022-08-10 | Stop reason: SDUPTHER

## 2022-07-07 RX ORDER — LISINOPRIL AND HYDROCHLOROTHIAZIDE 20; 12.5 MG/1; MG/1
1 TABLET ORAL DAILY
Qty: 30 TABLET | Refills: 0 | Status: SHIPPED | OUTPATIENT
Start: 2022-07-07 | End: 2022-08-10 | Stop reason: SDUPTHER

## 2022-07-07 RX ORDER — ROSUVASTATIN CALCIUM 40 MG/1
40 TABLET, COATED ORAL
Qty: 30 TABLET | Refills: 0 | Status: SHIPPED | OUTPATIENT
Start: 2022-07-07 | End: 2022-08-10 | Stop reason: SDUPTHER

## 2022-07-07 RX ORDER — ATENOLOL 50 MG/1
50 TABLET ORAL DAILY
Qty: 30 TABLET | Refills: 0 | Status: SHIPPED | OUTPATIENT
Start: 2022-07-07 | End: 2022-08-10 | Stop reason: SDUPTHER

## 2022-07-07 NOTE — TELEPHONE ENCOUNTER
PT CALLED REQUESTING HIS MEDICATIONS BE FILLED  UPON LOOKING AT HIS APPTS IT WAS FOUND THAT THE PT HAS NOT BEEN SEEN IN THE OFFICE IN OVER 1 YEAR  A 1 MONTH SUPPLY WAS SENT IN AND THE PT WAS CALLED  PT DID NOT ANSWER  LEFT A MESSAGE LETTING HIM KNOW THAT HE NEEDS AN APPT AND HE IS TO CALL THE OFFICE TO GET THIS SCHEDULED

## 2022-08-10 ENCOUNTER — OFFICE VISIT (OUTPATIENT)
Dept: FAMILY MEDICINE CLINIC | Facility: CLINIC | Age: 42
End: 2022-08-10
Payer: COMMERCIAL

## 2022-08-10 VITALS
BODY MASS INDEX: 41.87 KG/M2 | HEART RATE: 92 BPM | OXYGEN SATURATION: 97 % | HEIGHT: 69 IN | WEIGHT: 282.7 LBS | SYSTOLIC BLOOD PRESSURE: 186 MMHG | DIASTOLIC BLOOD PRESSURE: 88 MMHG | TEMPERATURE: 99.8 F

## 2022-08-10 DIAGNOSIS — I10 ESSENTIAL HYPERTENSION, BENIGN: Primary | ICD-10-CM

## 2022-08-10 DIAGNOSIS — E66.01 MORBID OBESITY WITH BMI OF 40.0-44.9, ADULT (HCC): ICD-10-CM

## 2022-08-10 DIAGNOSIS — C43.30 MALIGNANT MELANOMA OF FACE (HCC): ICD-10-CM

## 2022-08-10 DIAGNOSIS — E78.2 MIXED HYPERLIPIDEMIA: ICD-10-CM

## 2022-08-10 DIAGNOSIS — Z11.59 ENCOUNTER FOR HEPATITIS C SCREENING TEST FOR LOW RISK PATIENT: ICD-10-CM

## 2022-08-10 DIAGNOSIS — R53.83 OTHER FATIGUE: ICD-10-CM

## 2022-08-10 PROCEDURE — 3725F SCREEN DEPRESSION PERFORMED: CPT | Performed by: FAMILY MEDICINE

## 2022-08-10 PROCEDURE — 99214 OFFICE O/P EST MOD 30 MIN: CPT | Performed by: FAMILY MEDICINE

## 2022-08-10 RX ORDER — LISINOPRIL AND HYDROCHLOROTHIAZIDE 20; 12.5 MG/1; MG/1
1 TABLET ORAL DAILY
Qty: 90 TABLET | Refills: 2 | Status: SHIPPED | OUTPATIENT
Start: 2022-08-10

## 2022-08-10 RX ORDER — ATENOLOL 50 MG/1
50 TABLET ORAL DAILY
Qty: 90 TABLET | Refills: 2 | Status: SHIPPED | OUTPATIENT
Start: 2022-08-10

## 2022-08-10 RX ORDER — EZETIMIBE 10 MG/1
10 TABLET ORAL DAILY
Qty: 90 TABLET | Refills: 2 | Status: SHIPPED | OUTPATIENT
Start: 2022-08-10

## 2022-08-10 RX ORDER — ROSUVASTATIN CALCIUM 40 MG/1
40 TABLET, COATED ORAL
Qty: 90 TABLET | Refills: 2 | Status: SHIPPED | OUTPATIENT
Start: 2022-08-10

## 2022-08-10 NOTE — ASSESSMENT & PLAN NOTE
Patient has hypertension  I did check his blood pressure again myself today and also found to be 186/90  Blood pressure is poorly controlled secondary to noncompliance with medication  I sent in new prescriptions for the patient for his medication to his mail-in pharmacy  I stressed to the patient the importance of getting his blood work done  He has not had blood work done in years  With the medication he is on, he needs to have his blood work done regularly  He needs his BUN, creatinine, potassium mm to heard with his blood pressure medication    I did order fasting blood work

## 2022-08-10 NOTE — PROGRESS NOTES
Assessment/Plan:    Essential hypertension, benign  Patient has hypertension  I did check his blood pressure again myself today and also found to be 186/90  Blood pressure is poorly controlled secondary to noncompliance with medication  I sent in new prescriptions for the patient for his medication to his mail-in pharmacy  I stressed to the patient the importance of getting his blood work done  He has not had blood work done in years  With the medication he is on, he needs to have his blood work done regularly  He needs his BUN, creatinine, potassium mm to heard with his blood pressure medication  I did order fasting blood work    Mixed hyperlipidemia  Patient has hyperlipidemia  I renewed his at he a and rosuvastatin  Again, I ordered a fasting lipid panel with reflex to direct LDL cholesterol  Patient has not had a lipid panel done in years  He needs to get his blood work done and keep his appointments  Morbid obesity with BMI of 40 0-44 9, adult [facility])  Patient did a great job  He lost 33 lb since his last visit  I congratulated him and asked him to continue to work card to try to lose weight and also asked him to exercise       Diagnoses and all orders for this visit:    Essential hypertension, benign  -     atenolol (TENORMIN) 50 mg tablet; Take 1 tablet (50 mg total) by mouth daily  -     lisinopril-hydrochlorothiazide (PRINZIDE,ZESTORETIC) 20-12 5 MG per tablet; Take 1 tablet by mouth daily  -     Comprehensive metabolic panel; Future    Mixed hyperlipidemia  -     ezetimibe (ZETIA) 10 mg tablet; Take 1 tablet (10 mg total) by mouth daily  -     rosuvastatin (CRESTOR) 40 MG tablet; Take 1 tablet (40 mg total) by mouth daily at bedtime  -     Comprehensive metabolic panel; Future  -     Lipid Panel with Direct LDL reflex; Future    Encounter for hepatitis C screening test for low risk patient  -     Hepatitis C antibody;  Future    Other fatigue  -     CBC and differential; Future  -     TSH, 3rd generation with Free T4 reflex; Future    Malignant melanoma of face ([facility] )    Morbid [facility] with BMI of 40 0-44 9, [facility]        BMI Counseling: Body mass index is 41 75 kg/m²  The BMI is above normal  Nutrition recommendations include reducing portion sizes, decreasing overall calorie intake and moderation in carbohydrate intake  Exercise recommendations include exercising 3-5 times per week  Subjective:      Patient ID: Heather Silva is a 40 y o  male  This is a 52-year-old white male presents to the office today for his routine checkup  The patient tells me he missed his last appointment and subsequently ran out of his medications  He tells me that the medications were called in for him but since I last saw him, his pharmacy changed and he was not able to fill the prescriptions  As such, he has been out of the medications  He tells me he feels well  He denies any headaches, chest pain, dizziness or lightheadedness  He tells me a friend passed away and it scared him  He is nail making a concerted effort to lose weight  He still never got blood work done  It has been years since he had blood work done  The following portions of the patient's history were reviewed and updated as appropriate: allergies, current medications, past family history, past medical history, past social history, past surgical history and problem list     Review of Systems   Respiratory: Negative for cough, shortness of breath and wheezing  Cardiovascular: Negative for chest pain, palpitations and leg swelling  Gastrointestinal: Negative for abdominal distention, abdominal pain, blood in stool, constipation, diarrhea and nausea  Skin:        He denies any new suspicious skin lesions   Neurological: Negative for dizziness, light-headedness and headaches           Objective:      BP (!) 186/88   Pulse 92   Temp 99 8 °F (37 7 °C) (Tympanic)   Ht 5' 9" (1 753 m)   Wt 128 kg (282 lb 11 2 oz) SpO2 97%   BMI 41 75 kg/m²          Physical Exam  Vitals reviewed  Constitutional:       Comments: Patient is a 20-year-old white male who appears his stated age  He is in no apparent distress   HENT:      Head: Normocephalic and atraumatic  Right Ear: Tympanic membrane, ear canal and external ear normal  There is no impacted cerumen  Left Ear: Tympanic membrane, ear canal and external ear normal  There is no impacted cerumen  Mouth/Throat:      Mouth: Mucous membranes are moist       Pharynx: Oropharynx is clear  No oropharyngeal exudate or posterior oropharyngeal erythema  Eyes:      General: No scleral icterus  Right eye: No discharge  Left eye: No discharge  Conjunctiva/sclera: Conjunctivae normal       Pupils: Pupils are equal, round, and reactive to light  Neck:      Vascular: No carotid bruit  Comments: No thyromegaly  Cardiovascular:      Rate and Rhythm: Normal rate and regular rhythm  Heart sounds: Normal heart sounds  No murmur heard  No friction rub  No gallop  Pulmonary:      Effort: Pulmonary effort is normal  No respiratory distress  Breath sounds: Normal breath sounds  No stridor  No wheezing, rhonchi or rales  Abdominal:      General: Bowel sounds are normal  There is no distension  Palpations: Abdomen is soft  There is no mass  Tenderness: There is no abdominal tenderness  There is no guarding  Comments: Abdomen is obese  There is no organomegaly   Musculoskeletal:      Cervical back: Neck supple  Lymphadenopathy:      Cervical: No cervical adenopathy  Psychiatric:         Mood and Affect: Mood normal          Behavior: Behavior normal          Thought Content:  Thought content normal          Judgment: Judgment normal        extremities:  Without cyanosis, clubbing, or edema

## 2022-08-10 NOTE — ASSESSMENT & PLAN NOTE
Patient has hyperlipidemia  I renewed his at he a and rosuvastatin  Again, I ordered a fasting lipid panel with reflex to direct LDL cholesterol  Patient has not had a lipid panel done in years  He needs to get his blood work done and keep his appointments

## 2022-08-10 NOTE — ASSESSMENT & PLAN NOTE
Patient did a great job  He lost 33 lb since his last visit    I congratulated him and asked him to continue to work card to try to lose weight and also asked him to exercise

## 2023-02-15 ENCOUNTER — OFFICE VISIT (OUTPATIENT)
Dept: FAMILY MEDICINE CLINIC | Facility: CLINIC | Age: 43
End: 2023-02-15

## 2023-02-15 VITALS
WEIGHT: 268.8 LBS | BODY MASS INDEX: 39.81 KG/M2 | DIASTOLIC BLOOD PRESSURE: 80 MMHG | OXYGEN SATURATION: 98 % | HEART RATE: 79 BPM | TEMPERATURE: 98.7 F | HEIGHT: 69 IN | SYSTOLIC BLOOD PRESSURE: 130 MMHG

## 2023-02-15 DIAGNOSIS — E78.2 MIXED HYPERLIPIDEMIA: ICD-10-CM

## 2023-02-15 DIAGNOSIS — I10 ESSENTIAL HYPERTENSION, BENIGN: Primary | ICD-10-CM

## 2023-02-15 NOTE — PROGRESS NOTES
Name: Colt Silva      : 1980      MRN: [id number]  Encounter Provider: Maryjane Ronquillo DO  Encounter Date: 2/15/2023   [facility]    Assessment & Plan     1  Essential hypertension, benign  Assessment & Plan:  Patient has hypertension which is well controlled  Note that the patient lost 14 pounds since his last visit  I encouraged him to exercise and to continue to try to diet and lose weight  Patient tells me his goal is to get down to 250 pounds at this time  For now, he will continue lisinopril-hydrochlorothiazide and atenolol  It should be noted that the patient still has not gotten blood work done  It has been years since he had his blood work done  I told the patient he needs to have his potassium, BUN and creatinine monitored while on this medication  He needs to get the blood work done  I told him that the hospital lab is open on weekends  He must get his blood work  2  Mixed hyperlipidemia  Assessment & Plan:  Patient is currently on rosuvastatin 40 mg daily and Zetia 10 mg daily  I have no idea what this patient's cholesterol is running  I have no idea about his liver enzymes  He must get his blood work  BMI Counseling: Body mass index is 39 69 kg/m²  The BMI is above normal  Nutrition recommendations include decreasing portion sizes, consuming healthier snacks and limiting drinks that contain sugar  Exercise recommendations include exercising 3-5 times per week  Rationale for BMI follow-up plan is due to patient being overweight or obese  Depression Screening and Follow-up Plan: Patient was screened for depression during today's encounter  They screened negative with a PHQ-2 score of 0  Subjective      Is a 60-year-old white male who presents to the office today for his routine checkup  The patient is doing well and has no complaints    The patient tells me that a good friend of his  at age 62 from complications of obesity and diabetes  The patient tells me that he decided he is not going to be like that and he has been losing weight  He is not exercising but he is watching his diet  He feels well  He tells me his back no longer hurts  His legs no longer hurt  Review of Systems   Constitutional: Negative for activity change, appetite change and unexpected weight change  Respiratory: Negative for cough and shortness of breath  Cardiovascular: Negative for chest pain, palpitations and leg swelling  Gastrointestinal: Negative for abdominal distention, abdominal pain, blood in stool, constipation, diarrhea and nausea  Skin:        Patient denies any suspicious skin lesions       Current Outpatient Medications on File Prior to Visit   Medication Sig   • atenolol (TENORMIN) 50 mg tablet Take 1 tablet (50 mg total) by mouth daily   • ezetimibe (ZETIA) 10 mg tablet Take 1 tablet (10 mg total) by mouth daily   • lisinopril-hydrochlorothiazide (PRINZIDE,ZESTORETIC) 20-12 5 MG per tablet Take 1 tablet by mouth daily   • rosuvastatin (CRESTOR) 40 MG tablet Take 1 tablet (40 mg total) by mouth daily at bedtime       Objective     /80   Pulse 79   Temp 98 7 °F (37 1 °C) (Tympanic)   Ht 5' 9" (1 753 m)   Wt 122 kg (268 lb 12 8 oz)   SpO2 98%   BMI 39 69 kg/m²     Physical Exam  Vitals reviewed  Constitutional:       Comments: This is a 50-year-old white male who appears his stated age  He is pleasant, cooperative, and in no distress   HENT:      Head: Normocephalic and atraumatic  Right Ear: Tympanic membrane, ear canal and external ear normal  There is no impacted cerumen  Left Ear: Tympanic membrane, ear canal and external ear normal  There is no impacted cerumen  Mouth/Throat:      Mouth: Mucous membranes are moist       Pharynx: Oropharynx is clear  No oropharyngeal exudate or posterior oropharyngeal erythema  Eyes:      General: No scleral icterus          Right eye: No discharge  Left eye: No discharge  Conjunctiva/sclera: Conjunctivae normal       Pupils: Pupils are equal, round, and reactive to light  Neck:      Comments: No thyromegaly is noted  Cardiovascular:      Rate and Rhythm: Normal rate and regular rhythm  Heart sounds: Normal heart sounds  No murmur heard  No friction rub  No gallop  Pulmonary:      Effort: Pulmonary effort is normal  No respiratory distress  Breath sounds: Normal breath sounds  No stridor  No wheezing, rhonchi or rales  Abdominal:      General: Bowel sounds are normal  There is no distension  Palpations: Abdomen is soft  There is no mass  Tenderness: There is no abdominal tenderness  There is no guarding  Comments: There is no organomegaly   Musculoskeletal:      Cervical back: Neck supple  Lymphadenopathy:      Cervical: No cervical adenopathy  Psychiatric:         Mood and Affect: Mood normal          Behavior: Behavior normal          Thought Content:  Thought content normal          Judgment: Judgment normal      Extremities: Without cyanosis, clubbing, or edema    Ale Ernie, DO

## 2023-02-16 NOTE — ASSESSMENT & PLAN NOTE
Patient is currently on rosuvastatin 40 mg daily and Zetia 10 mg daily  I have no idea what this patient's cholesterol is running  I have no idea about his liver enzymes  He must get his blood work

## 2023-02-16 NOTE — ASSESSMENT & PLAN NOTE
Patient has hypertension which is well controlled  Note that the patient lost 14 pounds since his last visit  I encouraged him to exercise and to continue to try to diet and lose weight  Patient tells me his goal is to get down to 250 pounds at this time  For now, he will continue lisinopril-hydrochlorothiazide and atenolol  It should be noted that the patient still has not gotten blood work done  It has been years since he had his blood work done  I told the patient he needs to have his potassium, BUN and creatinine monitored while on this medication  He needs to get the blood work done  I told him that the hospital lab is open on weekends  He must get his blood work

## 2023-03-25 DIAGNOSIS — I10 ESSENTIAL HYPERTENSION, BENIGN: ICD-10-CM

## 2023-03-25 DIAGNOSIS — E78.2 MIXED HYPERLIPIDEMIA: ICD-10-CM

## 2023-03-25 RX ORDER — LISINOPRIL AND HYDROCHLOROTHIAZIDE 20; 12.5 MG/1; MG/1
1 TABLET ORAL DAILY
Qty: 90 TABLET | Refills: 2 | Status: SHIPPED | OUTPATIENT
Start: 2023-03-25

## 2023-03-25 RX ORDER — ATENOLOL 50 MG/1
TABLET ORAL
Qty: 90 TABLET | Refills: 2 | Status: SHIPPED | OUTPATIENT
Start: 2023-03-25

## 2023-03-25 RX ORDER — ROSUVASTATIN CALCIUM 40 MG/1
TABLET, COATED ORAL
Qty: 90 TABLET | Refills: 2 | Status: SHIPPED | OUTPATIENT
Start: 2023-03-25

## 2023-03-25 RX ORDER — EZETIMIBE 10 MG/1
10 TABLET ORAL DAILY
Qty: 90 TABLET | Refills: 2 | Status: SHIPPED | OUTPATIENT
Start: 2023-03-25

## 2023-12-04 ENCOUNTER — OFFICE VISIT (OUTPATIENT)
Dept: URGENT CARE | Facility: CLINIC | Age: 43
End: 2023-12-04
Payer: COMMERCIAL

## 2023-12-04 VITALS
OXYGEN SATURATION: 100 % | DIASTOLIC BLOOD PRESSURE: 68 MMHG | HEART RATE: 98 BPM | SYSTOLIC BLOOD PRESSURE: 130 MMHG | TEMPERATURE: 98 F | RESPIRATION RATE: 17 BRPM
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
## 2023-12-04 PROCEDURE — 99203 OFFICE O/P NEW LOW 30 MIN: CPT | Performed by: PHYSICIAN ASSISTANT

## 2023-12-04 RX ORDER — DOXYCYCLINE HYCLATE 100 MG
100 TABLET ORAL 2 TIMES DAILY
Qty: 20 TABLET | Refills: 0 | Status: SHIPPED | OUTPATIENT
Start: 2023-12-04 | End: 2023-12-14

## 2023-12-04 NOTE — PROGRESS NOTES
[facility]        NAME: Zia Garcia is a 44 y.o. male  : 1980    MRN: [id number]  DATE: 2023  TIME: 6:22 PM    Assessment and Plan   Tick bite, unspecified site, initial encounter [W57. XXXA]  1. Tick bite, unspecified site, initial encounter  doxycycline hyclate (VIBRA-TABS) 100 mg tablet            Patient Instructions     Patient Instructions   TICK BITE OVERVIEW   There are many different types of ticks in the [facility], some of which are capable of transmitting infections. The risk of developing these infections depends upon the geographic location, season of the year, type of tick, and, for Lyme disease, how long the tick was attached to the skin. While many people are concerned after being bitten by a tick, the risk of acquiring a tick-borne infection is quite low, even if the tick has been attached, fed, and is actually carrying an infectious agent. Ticks transmit infection only after they have attached and are taking a blood meal from their new host. A tick that has not attached (and therefore has not yet become engorged from its blood meal) has not passed any infection. Since the deer tick that transmits Lyme disease typically feeds for >36 hours before transmission of the spirochete, the risk of acquiring Lyme disease from an observed tick bite, for example, is only 1 to 3 percent, even in an area where the disease is common. However, the risk is significantly greater if the tick has fed for >72 hours. The organism that causes Lyme disease, Borrelia burgdorferi, lies dormant in the inner aspect of the tick's midgut. The organism becomes active only after exposure to the warm blood meal entering the tick's gut. Once active, the organism enters the tick's salivary glands. As the tick feeds, it must get rid of excess water through the salivary glands.  Thus, the tick will literally salivate organisms as it feeds, thereby passing the infection to the host.  If a person is bitten by a deer tick (the type of tick that carries Lyme disease), a healthcare provider will likely advise one of two approaches:  ?Observe and treat if signs or symptoms of infection develop  ? Treat with a preventive antibiotic immediately  There is no benefit of blood testing for Lyme disease at the time of the tick bite; even people who become infected will not have a positive blood test until approximately two to six weeks after the infection develops (post-tick bite). The history of the tick bite will largely determine which of these options is chosen. Before seeking medical attention, the affected person or household member should carefully remove the tick and make note of its appearance (figure 1). Only the Ixodes species of tick, also known as the deer tick, causes Lyme disease. HOW TO REMOVE A TICK   The proper way to remove a tick is to use a set of fine tweezers and  the tick as close to the skin as is possible. Do not use a smoldering match or cigarette, nail polish, petroleum jelly (eg, Vaseline), liquid soap, or kerosene because they may irritate the tick and cause it to behave like a syringe, injecting bodily fluids into the wound. The proper technique for tick removal includes the following:  ?Use curved tweezers to grasp the tick as close to the skin surface as possible. ?Pull backwards gently but firmly, using an even, steady pressure. Do not jerk or twist.  ?Do not squeeze, crush, or puncture the body of the tick, since its bodily fluids may contain infection-causing organisms. ? After removing the tick, wash the skin and hands thoroughly with soap and water. ?If any mouth parts of the tick remain in the skin, these should be left alone; they will be expelled on their own. Attempts to remove these parts may result in significant skin trauma.   AFTER THE TICK IS REMOVED   Tick characteristics -- It is helpful if the person can provide information about the size and color of the tick (figure 1), whether it was actually attached to the skin, if it was engorged (that is, full of blood), and how long it was attached. ? The nymphal stage of the tick, which is an immature stage, is primarily responsible for transmission of the Lyme disease agent. These nymphal ticks are brown and approximately the size of a poppy seed or pencil point. They are black-legged ticks (often called "deer ticks" in the [place]); however, the size increases with feeding (figure 1). These ticks can transmit B. burgdorferi (the bacterium that causes Lyme disease) and a number of other tick-borne infections, including (but not limited to) babesiosis and anaplasmosis. [place]son ticks live primarily in the [facility] and [place] region ([place]) and in [address] [place] [address] region ([place], [place], [place], [place]) of the [place], and less commonly in the [facility] ([facility]). ?Ticks that are brown with a white collar and about the size of a pencil eraser are more likely to be dog ticks (Dermacentor species) (figure 1). These ticks do not carry Lyme disease, but can rarely carry another tick-borne infection called [facility] spotted fever that can be serious or even fatal.  ?A brown to black tick with a white splotch on its back is likely a female Amblyomma americanum ([place] tick; named after the white splotch) (picture 1). This species of tick has been reported to spread an illness called [facility] tick-associated rash illness (STARI). STARI causes a rash that is similar to the erythema migrans rash, but without the other features of Lyme disease. Although this rash is thought to be caused by an infection, a cause for the infection has not yet been identified. This type of tick can also carry and transmit another infection called human monocytic ehrlichiosis. ? A tick that was not attached, was easy to remove or just walking on the skin, and was still flat and tiny and not full of blood when it was removed could not have transmitted Lyme disease or any other infection since it had not yet taken a blood meal.  ?Only ticks that are attached and have finished feeding or are near the end of their meal can transmit Lyme disease. After arriving on the skin, the tick that spreads Lyme disease usually takes 24 hours before feeding begins. ? Even if a tick is attached, it must have taken a blood meal to transmit Lyme disease. At least 36 to 48 hours of feeding is typically required for a tick to have fed and then transmit the bacterium that causes Lyme disease. After this amount of time, the tick will be engorged (full of blood). An engorged tick has a globular shape and is larger than an unengorged one. ?It is not clear how long a tick needs to be attached to transmit organisms other than B. burgdorferi. Need for treatment -- The clinician will review the description of the tick, along with any physical symptoms, to decide upon a course of action. The Infectious Diseases Society of [address] (IDSA) recommends preventive treatment with the antibiotic doxycycline only in people who meet ALL of the following criteria:  ?The attached tick is identified as an adult or nymphal Ixodes scapularis (deer) tick. ? The tick is estimated to have been attached for ? 36 hours (based upon how engorged the tick appears or the amount of time since outdoor exposure). ?The antibiotic can be given within 72 hours of tick removal.  ? The bite occurs in a highly endemic area, meaning a place where Lyme disease is common. In the [facility], you can find out the incidence of Lyme disease by state on the [employer]. ?The person can take doxycycline (meaning there is no reason to avoid this particular antibiotic).   If the person meets ALL of the above criteria, the recommended dose of doxycycline is a single dose of 200 mg for adults, and 4 mg/kg, up to a maximum dose of 200 mg, in children. If the person cannot take doxycycline, the IDSA does not recommend preventive treatment with an alternate antibiotic for several reasons: there are no data to support a short course of another antibiotic, a longer course of antibiotics may have side effects, antibiotic treatment is highly effective if Lyme disease were to develop, and the risk of developing a serious complication of Lyme disease after a recognized bite is extremely low. MONITORING FOR LYME DISEASE   Many people have incorrect information about Lyme disease. For example, some people are concerned that Lyme disease is untreatable if antibiotics are not given early (this is untrue; even later features of Lyme disease can be effectively treated with appropriate antibiotics). Many local Lyme disease networks and national organizations disseminate unproven information and should not be the sole source of education about Lyme disease. Reputable sources are listed below. (See 'Where to get more information' below.)  Signs of Lyme disease -- Whether or not a clinician is consulted after a tick bite, the person who was bitten (or the parents, if a child was bitten) should observe the area of the bite for expanding redness, which would suggest the characteristic “erythema migrans” rash of Lyme disease (picture 2). Approximately 80 percent of people with Lyme disease develop erythema migrans; 10 to 20 percent of people have multiple lesions. (See "Patient education: Lyme disease symptoms and diagnosis (Beyond the Basics)". )  In people with erythema migrans, the rash occurs within one month of the tick bite (typically within a week of the tick bite), although only one-third of people recall the tick bite that gave them Lyme disease. The rash is usually a salmon color although, rarely, it can be an intense red, sometimes resembling a skin infection. The color may be almost uniform.  The lesion typically expands over a few days or weeks and can reach over 20 cm (8 inches) in diameter. As the rash expands, it can become clear (skin-colored) in the center. The center of the rash can then appear a lighter color than its edges or the rash can develop into a series of concentric rings giving it a "bull's eye" appearance. The rash usually causes no symptoms, but burning or itching has been reported. Components of tick saliva can also cause a rash; however, this rash should not be confused with erythema migrans. The rash caused by tick saliva typically occurs while the tick is still feeding or just after the tick detaches, and usually does not expand to a size larger than a dime. If a rash or other signs or symptoms suggestive of Lyme disease develop (table 1), the person should see a health care provider for proper diagnosis and treatment. (See "Patient education: Lyme disease treatment (Beyond the Basics)". )  WHERE TO GET MORE INFORMATION   Your healthcare provider is the best source of information for questions and concerns related to your medical problem. This article will be updated as needed on our web site ([email or web address]). Related topics for patients, as well as selected articles written for healthcare professionals, are also available. Some of the most relevant are listed below. Patient level information -- [employer] offers two types of patient education materials. The Basics -- The Basics patient education pieces answer the four or five key questions a patient might have about a given condition. These articles are best for patients who want a general overview and who prefer short, easy-to-read materials. Patient education: Lyme disease (The Basics)  Patient education: Insect bites and stings (The Basics)  Patient education: [facility] spotted fever (The Basics)  Beyond the Basics -- Beyond the Basics patient education pieces are longer, more sophisticated, and more detailed.  These articles are best for patients who want in-depth information and are comfortable with some medical jargon. Patient education: Lyme disease prevention (Beyond the Basics)  Patient education: Lyme disease symptoms and diagnosis (Beyond the Basics)  Patient education: Lyme disease treatment (Beyond the Basics)   Professional level information -- Professional level articles are designed to keep doctors and other health professionals up-to-date on the latest medical findings. These articles are thorough, long, and complex, and they contain multiple references to the research on which they are based. Professional level articles are best for people who are comfortable with a lot of medical terminology and who want to read the same materials their doctors are reading. Clinical manifestations of Lyme disease in adults  Diagnosis of Lyme disease  Evaluation of a tick bite for possible Lyme disease  Insect and other arthropod bites  Lyme carditis  Epidemiology of Lyme disease  Musculoskeletal manifestations of Lyme disease  Prevention of Lyme disease  Treatment of Lyme diseaseh      Follow up with PCP in 3-5 days. Proceed to  ER if symptoms worsen. Chief Complaint     Chief Complaint   Patient presents with    Tick Removal     Bit by tick 1 day ago   Patient thought he removed the tick but thinks there may be a part of the tick in his leg         History of Present Illness       Patient presents to the clinic for a tick bite on the right leg. He states that he removed the tick himself with his fingers. He is concerned that there may be some body parts left in his leg. He states that he has had increased redness around the tick bite. He currently denies fevers, chills, chills, or joint pain. He also denies associated palsy. Review of Systems   Review of Systems   HENT:  Negative for congestion, ear discharge, ear pain and facial swelling. Skin:  Positive for rash.    Neurological:  Negative for dizziness, tremors, seizures, syncope, weakness, light-headedness and numbness. Current Medications       Current Outpatient Medications:     atenolol (TENORMIN) 50 mg tablet, TAKE 1 TABLET BY MOUTH ONCE A DAY, Disp: 90 tablet, Rfl: 2    doxycycline hyclate (VIBRA-TABS) 100 mg tablet, Take 1 tablet (100 mg total) by mouth 2 (two) times a day for 10 days, Disp: 20 tablet, Rfl: 0    ezetimibe (ZETIA) 10 mg tablet, TAKE 1 TABLET (10 MG TOTAL) BY MOUTH DAILY, Disp: 90 tablet, Rfl: 2    lisinopril-hydrochlorothiazide (PRINZIDE,ZESTORETIC) 20-12.5 MG per tablet, TAKE 1 TABLET BY MOUTH DAILY, Disp: 90 tablet, Rfl: 2    rosuvastatin (CRESTOR) 40 MG tablet, TAKE 1 TABLET BY MOUTH AT BEDTIME, Disp: 90 tablet, Rfl: 2    Current Allergies     Allergies as of 12/04/2023    (No Known Allergies)            The following portions of the patient's history were reviewed and updated as appropriate: allergies, current medications, past family history, past medical history, past social history, past surgical history and problem list.     Past Medical History:   Diagnosis Date    Cancer ([address])     skin    Coronary artery disease     Hyperlipidemia     Hypertension        Past Surgical History:   Procedure Laterality Date    ANTERIOR CRUCIATE LIGAMENT REPAIR Left     APPENDECTOMY      CARDIAC CATHETERIZATION         Family History   Problem Relation Age of Onset    No Known Problems Mother     No Known Problems Father          Medications have been verified. Objective   /68   Pulse 98   Temp 98 °F (36.7 °C)   Resp 17   SpO2 100%        Physical Exam     Physical Exam  Skin:            Comments: -There is a tick bite noted on the left lower leg. There is surrounding erythema that is 1 cm x 1 cm in size. There is a bull's-eye rash.   There is some purulent drainage

## 2023-12-04 NOTE — PATIENT INSTRUCTIONS
TICK BITE OVERVIEW   There are many different types of ticks in the [facility], some of which are capable of transmitting infections. The risk of developing these infections depends upon the geographic location, season of the year, type of tick, and, for Lyme disease, how long the tick was attached to the skin. While many people are concerned after being bitten by a tick, the risk of acquiring a tick-borne infection is quite low, even if the tick has been attached, fed, and is actually carrying an infectious agent. Ticks transmit infection only after they have attached and are taking a blood meal from their new host. A tick that has not attached (and therefore has not yet become engorged from its blood meal) has not passed any infection. Since the deer tick that transmits Lyme disease typically feeds for >36 hours before transmission of the spirochete, the risk of acquiring Lyme disease from an observed tick bite, for example, is only 1 to 3 percent, even in an area where the disease is common. However, the risk is significantly greater if the tick has fed for >72 hours. The organism that causes Lyme disease, Borrelia burgdorferi, lies dormant in the inner aspect of the tick's midgut. The organism becomes active only after exposure to the warm blood meal entering the tick's gut. Once active, the organism enters the tick's salivary glands. As the tick feeds, it must get rid of excess water through the salivary glands. Thus, the tick will literally salivate organisms as it feeds, thereby passing the infection to the host.  If a person is bitten by a deer tick (the type of tick that carries Lyme disease), a healthcare provider will likely advise one of two approaches:  ?Observe and treat if signs or symptoms of infection develop  ? Treat with a preventive antibiotic immediately  There is no benefit of blood testing for Lyme disease at the time of the tick bite; even people who become infected will not have a positive blood test until approximately two to six weeks after the infection develops (post-tick bite). The history of the tick bite will largely determine which of these options is chosen. Before seeking medical attention, the affected person or household member should carefully remove the tick and make note of its appearance (figure 1). Only the Ixodes species of tick, also known as the deer tick, causes Lyme disease. HOW TO REMOVE A TICK   The proper way to remove a tick is to use a set of fine tweezers and  the tick as close to the skin as is possible. Do not use a smoldering match or cigarette, nail polish, petroleum jelly (eg, Vaseline), liquid soap, or kerosene because they may irritate the tick and cause it to behave like a syringe, injecting bodily fluids into the wound. The proper technique for tick removal includes the following:  ?Use curved tweezers to grasp the tick as close to the skin surface as possible. ?Pull backwards gently but firmly, using an even, steady pressure. Do not jerk or twist.  ?Do not squeeze, crush, or puncture the body of the tick, since its bodily fluids may contain infection-causing organisms. ? After removing the tick, wash the skin and hands thoroughly with soap and water. ?If any mouth parts of the tick remain in the skin, these should be left alone; they will be expelled on their own. Attempts to remove these parts may result in significant skin trauma. AFTER THE TICK IS REMOVED   Tick characteristics -- It is helpful if the person can provide information about the size and color of the tick (figure 1), whether it was actually attached to the skin, if it was engorged (that is, full of blood), and how long it was attached. ? The nymphal stage of the tick, which is an immature stage, is primarily responsible for transmission of the Lyme disease agent. These nymphal ticks are brown and approximately the size of a poppy seed or pencil point.  They are black-legged ticks (often called "deer ticks" in the [place]); however, the size increases with feeding (figure 1). These ticks can transmit B. burgdorferi (the bacterium that causes Lyme disease) and a number of other tick-borne infections, including (but not limited to) babesiosis and anaplasmosis. [place] ticks live primarily in the [facility] and [place] region ([place] to [place]) and in [address] [place] [address] region ([place], [place], [place], [place]) of the [place], and less commonly in the [facility] ?Ticks that are brown with a white collar and about the size of a pencil eraser are more likely to be dog ticks (Dermacentor species) (figure 1). These ticks do not carry Lyme disease, but can rarely carry another tick-borne infection called [facility] spotted fever that can be serious or even fatal.  ?A brown to black tick with a white splotch on its back is likely a female Amblyomma americanum ([place] tick; named after the white splotch) (picture 1). This species of tick has been reported to spread an illness called [address] [facility] tick-associated rash illness (STARI). STARI causes a rash that is similar to the erythema migrans rash, but without the other features of Lyme disease. Although this rash is thought to be caused by an infection, a cause for the infection has not yet been identified. This type of tick can also carry and transmit another infection called human monocytic ehrlichiosis. ? A tick that was not attached, was easy to remove or just walking on the skin, and was still flat and tiny and not full of blood when it was removed could not have transmitted Lyme disease or any other infection since it had not yet taken a blood meal.  ?Only ticks that are attached and have finished feeding or are near the end of their meal can transmit Lyme disease.  After arriving on the skin, the tick that spreads Lyme disease usually takes 24 hours before feeding begins. ? Even if a tick is attached, it must have taken a blood meal to transmit Lyme disease. At least 36 to 48 hours of feeding is typically required for a tick to have fed and then transmit the bacterium that causes Lyme disease. After this amount of time, the tick will be engorged (full of blood). An engorged tick has a globular shape and is larger than an unengorged one. ?It is not clear how long a tick needs to be attached to transmit organisms other than B. burgdorferi. Need for treatment -- The clinician will review the description of the tick, along with any physical symptoms, to decide upon a course of action. The Infectious Diseases Society of [address] (IDSA) recommends preventive treatment with the antibiotic doxycycline only in people who meet ALL of the following criteria:  ?The attached tick is identified as an adult or nymphal Ixodes scapularis (deer) tick. ? The tick is estimated to have been attached for ? 36 hours (based upon how engorged the tick appears or the amount of time since outdoor exposure). ?The antibiotic can be given within 72 hours of tick removal.  ? The bite occurs in a highly endemic area, meaning a place where Lyme disease is common. In the [facility], you can find out the incidence of Lyme disease by state on the [employer]. ?The person can take doxycycline (meaning there is no reason to avoid this particular antibiotic). If the person meets ALL of the above criteria, the recommended dose of doxycycline is a single dose of 200 mg for adults, and 4 mg/kg, up to a maximum dose of 200 mg, in children.   If the person cannot take doxycycline, the IDSA does not recommend preventive treatment with an alternate antibiotic for several reasons: there are no data to support a short course of another antibiotic, a longer course of antibiotics may have side effects, antibiotic treatment is highly effective if Lyme disease were to develop, and the risk of developing a serious complication of Lyme disease after a recognized bite is extremely low. MONITORING FOR LYME DISEASE   Many people have incorrect information about Lyme disease. For example, some people are concerned that Lyme disease is untreatable if antibiotics are not given early (this is untrue; even later features of Lyme disease can be effectively treated with appropriate antibiotics). Many local Lyme disease networks and national organizations disseminate unproven information and should not be the sole source of education about Lyme disease. Reputable sources are listed below. (See 'Where to get more information' below.)  Signs of Lyme disease -- Whether or not a clinician is consulted after a tick bite, the person who was bitten (or the parents, if a child was bitten) should observe the area of the bite for expanding redness, which would suggest the characteristic “erythema migrans” rash of Lyme disease (picture 2). Approximately 80 percent of people with Lyme disease develop erythema migrans; 10 to 20 percent of people have multiple lesions. (See "Patient education: Lyme disease symptoms and diagnosis (Beyond the Basics)". )  In people with erythema migrans, the rash occurs within one month of the tick bite (typically within a week of the tick bite), although only one-third of people recall the tick bite that gave them Lyme disease. The rash is usually a salmon color although, rarely, it can be an intense red, sometimes resembling a skin infection. The color may be almost uniform. The lesion typically expands over a few days or weeks and can reach over 20 cm (8 inches) in diameter. As the rash expands, it can become clear (skin-colored) in the center. The center of the rash can then appear a lighter color than its edges or the rash can develop into a series of concentric rings giving it a "bull's eye" appearance. The rash usually causes no symptoms, but burning or itching has been reported.   Components of tick saliva can also cause a rash; however, this rash should not be confused with erythema migrans. The rash caused by tick saliva typically occurs while the tick is still feeding or just after the tick detaches, and usually does not expand to a size larger than a dime. If a rash or other signs or symptoms suggestive of Lyme disease develop (table 1), the person should see a health care provider for proper diagnosis and treatment. (See "Patient education: Lyme disease treatment (Beyond the Basics)". )  WHERE TO GET MORE INFORMATION   Your healthcare provider is the best source of information for questions and concerns related to your medical problem. This article will be updated as needed on our web site ([email or web address]). Related topics for patients, as well as selected articles written for healthcare professionals, are also available. Some of the most relevant are listed below. Patient level information -- [employer] offers two types of patient education materials. The Basics -- The Basics patient education pieces answer the four or five key questions a patient might have about a given condition. These articles are best for patients who want a general overview and who prefer short, easy-to-read materials. Patient education: Lyme disease (The Basics)  Patient education: Insect bites and stings (The Basics)  Patient education: [facility] spotted fever (The Basics)  Beyond the Basics -- Beyond the Basics patient education pieces are longer, more sophisticated, and more detailed. These articles are best for patients who want in-depth information and are comfortable with some medical jargon.   Patient education: Lyme disease prevention (Beyond the Basics)  Patient education: Lyme disease symptoms and diagnosis (Beyond the Basics)  Patient education: Lyme disease treatment (Beyond the Basics)   Professional level information -- Professional level articles are designed to keep doctors and other health professionals up-to-date on the latest medical findings. These articles are thorough, long, and complex, and they contain multiple references to the research on which they are based. Professional level articles are best for people who are comfortable with a lot of medical terminology and who want to read the same materials their doctors are reading.   Clinical manifestations of Lyme disease in adults  Diagnosis of Lyme disease  Evaluation of a tick bite for possible Lyme disease  Insect and other arthropod bites  Lyme carditis  Epidemiology of Lyme disease  Musculoskeletal manifestations of Lyme disease  Prevention of Lyme disease  Treatment of Lyme diseaseh

## 2023-12-10 ENCOUNTER — OFFICE VISIT (OUTPATIENT)
Dept: URGENT CARE | Facility: MEDICAL CENTER | Age: 43
End: 2023-12-10
Payer: COMMERCIAL

## 2023-12-10 VITALS
SYSTOLIC BLOOD PRESSURE: 106 MMHG | OXYGEN SATURATION: 96 % | HEART RATE: 75 BPM | TEMPERATURE: 98.2 F | DIASTOLIC BLOOD PRESSURE: 76 MMHG | BODY MASS INDEX: 40.08 KG/M2 | WEIGHT: 271.4 LBS | RESPIRATION RATE: 20 BRPM

## 2023-12-10 DIAGNOSIS — B02.9 HERPES ZOSTER WITHOUT COMPLICATION: Primary | ICD-10-CM

## 2023-12-10 PROCEDURE — 99212 OFFICE O/P EST SF 10 MIN: CPT | Performed by: PHYSICIAN ASSISTANT

## 2023-12-10 RX ORDER — PREDNISONE 20 MG/1
TABLET ORAL
Qty: 12 TABLET | Refills: 0 | Status: SHIPPED | OUTPATIENT
Start: 2023-12-10

## 2023-12-10 RX ORDER — VALACYCLOVIR HYDROCHLORIDE 1 G/1
1000 TABLET, FILM COATED ORAL 3 TIMES DAILY
Qty: 21 TABLET | Refills: 0 | Status: SHIPPED | OUTPATIENT
Start: 2023-12-10 | End: 2023-12-17

## 2023-12-10 NOTE — PATIENT INSTRUCTIONS
Start Valtrex and Prednisone as prescribed  Contact eye doctor tomorrow  Tylenol/Ibuprofen if needed for pain  Follow up with PCP as shingles could cause long term pain

## 2023-12-10 NOTE — PROGRESS NOTES
[facility]        NAME: Jose Carmichael is a 44 y.o. male  : 1980    MRN: [id number]  DATE: December 10, 2023  TIME: 8:46 AM    Assessment and Plan   Herpes zoster without complication [J18.3]  1. Herpes zoster without complication  valACYclovir (VALTREX) 1,000 mg tablet    predniSONE 20 mg tablet            Patient Instructions     Start Valtrex and Prednisone as prescribed  Contact eye doctor tomorrow  Tylenol/Ibuprofen if needed for pain  Follow up with PCP as shingles could cause long term pain    Follow up with PCP in 3-5 days. Proceed to  ER if symptoms worsen. Chief Complaint     Chief Complaint   Patient presents with   • Rash     Started Friday to left side of forehead           History of Present Illness       Patient presents with a painful, blistery type rash on the left side of his forehead. He has a previous history of chickenpox as a child. Has been alternating Tylenol and ibuprofen for pain. Rash  This is a new problem. The current episode started yesterday. The problem is unchanged. The affected locations include the scalp and head. The rash is characterized by pain. Pertinent negatives include no anorexia, congestion, cough, diarrhea, facial edema, fatigue, fever, joint pain, rhinorrhea, shortness of breath, sore throat or vomiting. Review of Systems   Review of Systems   Constitutional:  Negative for fatigue and fever. HENT:  Negative for congestion, rhinorrhea and sore throat. Eyes:  Negative for pain. Respiratory:  Negative for cough and shortness of breath. Gastrointestinal:  Negative for anorexia, diarrhea and vomiting. Musculoskeletal:  Negative for joint pain. Skin:  Positive for rash.          Current Medications       Current Outpatient Medications:   •  atenolol (TENORMIN) 50 mg tablet, TAKE 1 TABLET BY MOUTH ONCE A DAY, Disp: 90 tablet, Rfl: 2  •  doxycycline hyclate (VIBRA-TABS) 100 mg tablet, Take 1 tablet (100 mg total) by mouth 2 (two) times a day for 10 days, Disp: 20 tablet, Rfl: 0  •  ezetimibe (ZETIA) 10 mg tablet, TAKE 1 TABLET (10 MG TOTAL) BY MOUTH DAILY, Disp: 90 tablet, Rfl: 2  •  lisinopril-hydrochlorothiazide (PRINZIDE,ZESTORETIC) 20-12.5 MG per tablet, TAKE 1 TABLET BY MOUTH DAILY, Disp: 90 tablet, Rfl: 2  •  predniSONE 20 mg tablet, 2 tabs daily x 5 days then 1 tab daily x 2 days, Disp: 12 tablet, Rfl: 0  •  rosuvastatin (CRESTOR) 40 MG tablet, TAKE 1 TABLET BY MOUTH AT BEDTIME, Disp: 90 tablet, Rfl: 2  •  valACYclovir (VALTREX) 1,000 mg tablet, Take 1 tablet (1,000 mg total) by mouth 3 (three) times a day for 7 days, Disp: 21 tablet, Rfl: 0    Current Allergies     Allergies as of 12/10/2023   • (No Known Allergies)            The following portions of the patient's history were reviewed and updated as appropriate: allergies, current medications, past family history, past medical history, past social history, past surgical history and problem list.     Past Medical History:   Diagnosis Date   • Cancer ([address])     skin   • Coronary artery disease    • Hyperlipidemia    • Hypertension        Past Surgical History:   Procedure Laterality Date   • ANTERIOR CRUCIATE LIGAMENT REPAIR Left    • APPENDECTOMY     • CARDIAC CATHETERIZATION         Family History   Problem Relation Age of Onset   • No Known Problems Mother    • No Known Problems Father          Medications have been verified. Objective   /76   Pulse 75   Temp 98.2 °F (36.8 °C)   Resp 20   Wt 123 kg (271 lb 6.4 oz)   SpO2 96%   BMI 40.08 kg/m²   No LMP for male patient. Physical Exam     Physical Exam  Vitals and nursing note reviewed. Constitutional:       Appearance: Normal appearance. HENT:      Head: Normocephalic.       Comments: Erythematous vesicular rash left side of forehead extending into the hairline     Mouth/Throat:      Mouth: Mucous membranes are moist.   Eyes:      Conjunctiva/sclera: Conjunctivae normal.      Pupils: Pupils are equal, round, and reactive to light. Comments: No rash on eyelids or periorbital region   Cardiovascular:      Rate and Rhythm: Normal rate and regular rhythm. Pulmonary:      Effort: Pulmonary effort is normal.   Skin:     General: Skin is warm. Neurological:      Mental Status: He is alert.

## 2024-02-24 DIAGNOSIS — E78.2 MIXED HYPERLIPIDEMIA: ICD-10-CM

## 2024-02-24 DIAGNOSIS — I10 ESSENTIAL HYPERTENSION, BENIGN: ICD-10-CM

## 2024-02-26 RX ORDER — ROSUVASTATIN CALCIUM 40 MG/1
TABLET, COATED ORAL
Qty: 90 TABLET | Refills: 2 | OUTPATIENT
Start: 2024-02-26

## 2024-02-26 RX ORDER — EZETIMIBE 10 MG/1
10 TABLET ORAL DAILY
Qty: 90 TABLET | Refills: 2 | OUTPATIENT
Start: 2024-02-26

## 2024-02-26 RX ORDER — LISINOPRIL AND HYDROCHLOROTHIAZIDE 20; 12.5 MG/1; MG/1
1 TABLET ORAL DAILY
Qty: 90 TABLET | Refills: 2 | OUTPATIENT
Start: 2024-02-26

## 2024-02-26 RX ORDER — ATENOLOL 50 MG/1
TABLET ORAL
Qty: 90 TABLET | Refills: 2 | OUTPATIENT
Start: 2024-02-26

## 2024-06-05 ENCOUNTER — OFFICE VISIT (OUTPATIENT)
Dept: FAMILY MEDICINE CLINIC | Facility: CLINIC | Age: 44
End: 2024-06-05
Payer: COMMERCIAL

## 2024-06-05 VITALS
BODY MASS INDEX: 39.77 KG/M2 | HEART RATE: 79 BPM | TEMPERATURE: 98.9 F | WEIGHT: 268.5 LBS | SYSTOLIC BLOOD PRESSURE: 136 MMHG | OXYGEN SATURATION: 97 % | HEIGHT: 69 IN | DIASTOLIC BLOOD PRESSURE: 80 MMHG

## 2024-06-05 DIAGNOSIS — G47.30 SLEEP APNEA, UNSPECIFIED TYPE: ICD-10-CM

## 2024-06-05 DIAGNOSIS — I10 ESSENTIAL HYPERTENSION, BENIGN: Primary | ICD-10-CM

## 2024-06-05 DIAGNOSIS — R53.83 OTHER FATIGUE: ICD-10-CM

## 2024-06-05 DIAGNOSIS — E66.01 CLASS 2 SEVERE OBESITY DUE TO EXCESS CALORIES WITH SERIOUS COMORBIDITY AND BODY MASS INDEX (BMI) OF 39.0 TO 39.9 IN ADULT (HCC): ICD-10-CM

## 2024-06-05 DIAGNOSIS — E78.2 MIXED HYPERLIPIDEMIA: ICD-10-CM

## 2024-06-05 PROBLEM — E66.09 CLASS 2 OBESITY DUE TO EXCESS CALORIES WITH BODY MASS INDEX (BMI) OF 39.0 TO 39.9 IN ADULT: Status: ACTIVE | Noted: 2020-11-23

## 2024-06-05 PROBLEM — E66.812 CLASS 2 OBESITY DUE TO EXCESS CALORIES WITH BODY MASS INDEX (BMI) OF 39.0 TO 39.9 IN ADULT: Status: ACTIVE | Noted: 2020-11-23

## 2024-06-05 PROCEDURE — 99214 OFFICE O/P EST MOD 30 MIN: CPT | Performed by: FAMILY MEDICINE

## 2024-06-05 RX ORDER — LISINOPRIL AND HYDROCHLOROTHIAZIDE 20; 12.5 MG/1; MG/1
1 TABLET ORAL DAILY
Qty: 90 TABLET | Refills: 2 | Status: SHIPPED | OUTPATIENT
Start: 2024-06-05

## 2024-06-05 RX ORDER — ROSUVASTATIN CALCIUM 40 MG/1
40 TABLET, COATED ORAL
Qty: 90 TABLET | Refills: 2 | Status: SHIPPED | OUTPATIENT
Start: 2024-06-05

## 2024-06-05 RX ORDER — EZETIMIBE 10 MG/1
10 TABLET ORAL DAILY
Qty: 90 TABLET | Refills: 2 | Status: SHIPPED | OUTPATIENT
Start: 2024-06-05

## 2024-06-05 RX ORDER — ATENOLOL 50 MG/1
50 TABLET ORAL DAILY
Qty: 90 TABLET | Refills: 2 | Status: SHIPPED | OUTPATIENT
Start: 2024-06-05

## 2024-06-05 NOTE — PROGRESS NOTES
Ambulatory Visit  Name: Prashant Rebolledo      : 1980      MRN: [id number]  Encounter Provider: Navid Lewis DO  Encounter Date: 2024   Encounter department: [facility]    Assessment & Plan   1. Essential hypertension, benign  Assessment & Plan:  I checked the patient's blood pressure myself today.  His blood pressure was 130/80.  His blood pressure is well-controlled.  I renewed his atenolol 50 mg daily in addition to lisinopril-hydrochlorothiazide 20/12.5 daily.  We must get electrolytes in order to assess the patient's potassium, BUN and creatinine.  They have been ordered many times in the past several years but the patient has not done so.  I urged the patient to get the blood work done.  We discussed lab times where he can go.  Orders:  -     atenolol (TENORMIN) 50 mg tablet; Take 1 tablet (50 mg total) by mouth daily  -     lisinopril-hydrochlorothiazide (PRINZIDE,ZESTORETIC) 20-12.5 MG per tablet; Take 1 tablet by mouth daily  2. Class 2 severe obesity due to excess calories with serious comorbidity and body mass index (BMI) of 39.0 to 39.9 in adult (HCC)  3. Sleep apnea, unspecified type  Assessment & Plan:  Patient needs further evaluation for obstructive sleep apnea.  I ordered a home study  Orders:  -     Ambulatory Referral to Sleep Medicine; Future  4. Mixed hyperlipidemia  Assessment & Plan:  Patient has hyperlipidemia.  He has not had any lab follow-up.  Again, labs were ordered but the patient did not get them.  I reordered fasting labs for the patient.  I refilled his Zetia 10 mg daily and rosuvastatin 40 mg daily.  I encouraged the patient to exercise and try to lose weight.  Orders:  -     ezetimibe (ZETIA) 10 mg tablet; Take 1 tablet (10 mg total) by mouth daily  -     rosuvastatin (CRESTOR) 40 MG tablet; Take 1 tablet (40 mg total) by mouth daily at bedtime  -     Lipid Panel with Direct LDL reflex; Future  -     Comprehensive metabolic panel;  "Future  5. Other fatigue  -     CBC and differential; Future  -     TSH, 3rd generation with Free T4 reflex; Future       History of Present Illness     Patient is a 44-year-old white male who presents to the office today for his routine checkup.  The patient tells me he was overdue for this appointment.  He has been compliant with use of his medication.  He feels well in general.  He never had his blood work done.  In fact, his labs have  since I ordered them last.  Is been years since he had his blood work done.  He tells me that since I last saw him, his 10 and 12-year-old daughters have also been diagnosed with familial hypercholesterolemia.  They are both taking atorvastatin.  Patient never got his sleep study done.  He tells me he would like a referral to be evaluated for sleep apnea.        Review of Systems   Constitutional:  Positive for fatigue.   Respiratory:  Positive for apnea. Negative for shortness of breath and wheezing.    Cardiovascular:  Negative for chest pain, palpitations and leg swelling.   Gastrointestinal:  Negative for abdominal distention, abdominal pain, blood in stool, constipation, diarrhea and nausea.       Objective     /80   Pulse 79   Temp 98.9 °F (37.2 °C) (Temporal)   Ht 5' 9\" (1.753 m)   Wt 122 kg (268 lb 8 oz)   SpO2 97%   BMI 39.65 kg/m²     Physical Exam  Vitals reviewed.   Constitutional:       Comments: This is a pleasant 44-year-old white male who appears his stated age.  He is in no apparent distress   HENT:      Head: Normocephalic and atraumatic.      Right Ear: Tympanic membrane, ear canal and external ear normal. There is no impacted cerumen.      Left Ear: Tympanic membrane, ear canal and external ear normal. There is no impacted cerumen.      Mouth/Throat:      Mouth: Mucous membranes are moist.      Pharynx: Oropharynx is clear. No oropharyngeal exudate or posterior oropharyngeal erythema.      Comments: Crowded oropharynx noted  Eyes:      " General: No scleral icterus.        Right eye: No discharge.         Left eye: No discharge.      Conjunctiva/sclera: Conjunctivae normal.      Pupils: Pupils are equal, round, and reactive to light.   Neck:      Comments: There is no thyromegaly noted.  Neck circumference is 18 three-quarter inches.  Cardiovascular:      Rate and Rhythm: Normal rate and regular rhythm.      Heart sounds: Normal heart sounds. No murmur heard.     No friction rub. No gallop.   Pulmonary:      Effort: Pulmonary effort is normal. No respiratory distress.      Breath sounds: Normal breath sounds. No stridor. No wheezing, rhonchi or rales.   Abdominal:      General: Bowel sounds are normal. There is no distension.      Palpations: Abdomen is soft. There is no mass.      Tenderness: There is no abdominal tenderness. There is no guarding.   Musculoskeletal:      Cervical back: Neck supple.   Lymphadenopathy:      Cervical: No cervical adenopathy.   Psychiatric:         Mood and Affect: Mood normal.         Behavior: Behavior normal.         Thought Content: Thought content normal.         Judgment: Judgment normal.     Extremities:  Without cyanosis, clubbing, or edema    Administrative Statements

## 2024-06-06 DIAGNOSIS — G47.30 SLEEP APNEA, UNSPECIFIED TYPE: Primary | ICD-10-CM

## 2024-06-06 NOTE — ASSESSMENT & PLAN NOTE
Patient has hyperlipidemia.  He has not had any lab follow-up.  Again, labs were ordered but the patient did not get them.  I reordered fasting labs for the patient.  I refilled his Zetia 10 mg daily and rosuvastatin 40 mg daily.  I encouraged the patient to exercise and try to lose weight.

## 2024-06-06 NOTE — ASSESSMENT & PLAN NOTE
I checked the patient's blood pressure myself today.  His blood pressure was 130/80.  His blood pressure is well-controlled.  I renewed his atenolol 50 mg daily in addition to lisinopril-hydrochlorothiazide 20/12.5 daily.  We must get electrolytes in order to assess the patient's potassium, BUN and creatinine.  They have been ordered many times in the past several years but the patient has not done so.  I urged the patient to get the blood work done.  We discussed lab times where he can go.

## 2025-01-28 ENCOUNTER — TELEPHONE (OUTPATIENT)
Dept: FAMILY MEDICINE CLINIC | Facility: CLINIC | Age: 45
End: 2025-01-28

## 2025-01-28 DIAGNOSIS — E78.2 MIXED HYPERLIPIDEMIA: ICD-10-CM

## 2025-01-28 DIAGNOSIS — I10 ESSENTIAL HYPERTENSION, BENIGN: ICD-10-CM

## 2025-01-28 RX ORDER — LISINOPRIL AND HYDROCHLOROTHIAZIDE 12.5; 2 MG/1; MG/1
1 TABLET ORAL DAILY
Qty: 90 TABLET | Refills: 0 | Status: SHIPPED | OUTPATIENT
Start: 2025-01-28

## 2025-01-28 RX ORDER — ATENOLOL 50 MG/1
50 TABLET ORAL DAILY
Qty: 90 TABLET | Refills: 0 | Status: SHIPPED | OUTPATIENT
Start: 2025-01-28

## 2025-01-28 RX ORDER — EZETIMIBE 10 MG
10 TABLET ORAL DAILY
Qty: 90 TABLET | Refills: 0 | Status: SHIPPED | OUTPATIENT
Start: 2025-01-28

## 2025-01-28 RX ORDER — ROSUVASTATIN CALCIUM 40 MG/1
TABLET, FILM COATED ORAL
Qty: 90 TABLET | Refills: 0 | Status: SHIPPED | OUTPATIENT
Start: 2025-01-28

## 2025-02-19 ENCOUNTER — OFFICE VISIT (OUTPATIENT)
Dept: FAMILY MEDICINE CLINIC | Facility: CLINIC | Age: 45
End: 2025-02-19
Payer: COMMERCIAL

## 2025-02-19 VITALS
DIASTOLIC BLOOD PRESSURE: 74 MMHG | WEIGHT: 268.7 LBS | BODY MASS INDEX: 39.8 KG/M2 | OXYGEN SATURATION: 98 % | HEART RATE: 66 BPM | HEIGHT: 69 IN | SYSTOLIC BLOOD PRESSURE: 128 MMHG | TEMPERATURE: 97.2 F

## 2025-02-19 DIAGNOSIS — Z85.820 HISTORY OF MELANOMA: ICD-10-CM

## 2025-02-19 DIAGNOSIS — I10 ESSENTIAL HYPERTENSION, BENIGN: Primary | ICD-10-CM

## 2025-02-19 DIAGNOSIS — E78.2 MIXED HYPERLIPIDEMIA: ICD-10-CM

## 2025-02-19 PROCEDURE — 99214 OFFICE O/P EST MOD 30 MIN: CPT | Performed by: FAMILY MEDICINE

## 2025-02-19 NOTE — PROGRESS NOTES
Name: Prashant Rebolledo      : 1980      MRN: [id number]  Encounter Provider: Navid Lewis DO  Encounter Date: 2025   Encounter department: [facility] PRIMARY CARE  :  Assessment & Plan  Essential hypertension, benign  Patient has hypertension.  His blood pressure is currently well-controlled.  I will have the patient continue atenolol 50 mg daily and lisinopril-hydrochlorothiazide 20/12.5 daily.  I advised the patient that I need to get his blood work done.  With the type of medication he is on, his potassium, BUN and creatinine need to be followed.  I have been with [facility] since .  He has not had any blood work done during that entire time.  All the blood work that I have ordered has  and it has been reordered and .  I asked him to make an appoint to get it done and suggest that he get it done Saturday morning.         Mixed hyperlipidemia  Patient has hyperlipidemia.  Patient will continue rosuvastatin 40 mg daily and Zetia 10 mg daily.  Again, the patient needs to have his lipid panel checked as well as LFTs.  I urged him to get his blood work done.         History of melanoma  Patient has a history of malignant melanoma of the face.  We again discussed use of sunscreens and sun protective clothing.                History of Present Illness   This is a 44-year-old white male who presents to the office today for his routine checkup.  I have not seen this patient since last .  He did not get his blood work done again.  He has not had blood work done in the entire time that I have been employed by [facility].  He did get a flu shot at work.  I reviewed his family history with him.  His father does have a history of diabetes.  Patient tells me that since I last saw him joined the gym across the street.  He is trying to go 3 times per week.      Review of Systems   Constitutional:  Positive for activity change (Recently joined gym). Negative for appetite  "change.   Cardiovascular:  Negative for chest pain, palpitations and leg swelling.   Gastrointestinal:  Negative for abdominal distention, abdominal pain, blood in stool, constipation, diarrhea and nausea.   Genitourinary:         Denies nocturia and weak urinary stream       Objective   /74   Pulse 66   Temp (!) 97.2 °F (36.2 °C) (Tympanic)   Ht 5' 9\" (1.753 m)   Wt 122 kg (268 lb 11.2 oz)   SpO2 98%   BMI 39.68 kg/m²      Physical Exam  Vitals reviewed.   Constitutional:       Comments: This is a 44-year-old white male who appears his stated age.  The patient is nonseptic in and in no apparent distress   HENT:      Head: Normocephalic and atraumatic.      Right Ear: Tympanic membrane, ear canal and external ear normal. There is no impacted cerumen.      Left Ear: Tympanic membrane, ear canal and external ear normal. There is no impacted cerumen.      Mouth/Throat:      Mouth: Mucous membranes are moist.      Pharynx: Oropharynx is clear. No oropharyngeal exudate or posterior oropharyngeal erythema.   Eyes:      General: No scleral icterus.        Right eye: No discharge.         Left eye: No discharge.      Conjunctiva/sclera: Conjunctivae normal.      Pupils: Pupils are equal, round, and reactive to light.   Neck:      Vascular: No carotid bruit.      Comments: No thyromegaly  Cardiovascular:      Rate and Rhythm: Normal rate and regular rhythm.      Heart sounds: Normal heart sounds. No murmur heard.     No friction rub. No gallop.   Pulmonary:      Effort: Pulmonary effort is normal. No respiratory distress.      Breath sounds: Normal breath sounds. No stridor. No wheezing, rhonchi or rales.   Abdominal:      General: Bowel sounds are normal. There is no distension.      Palpations: Abdomen is soft. There is no mass.      Tenderness: There is no abdominal tenderness. There is no guarding.      Comments: There was no hepatosplenomegaly   Musculoskeletal:      Cervical back: Neck supple. "   Lymphadenopathy:      Cervical: No cervical adenopathy.   Psychiatric:         Mood and Affect: Mood normal.         Behavior: Behavior normal.         Thought Content: Thought content normal.         Judgment: Judgment normal.     Extremities: Without cyanosis, clubbing, or edema

## 2025-02-20 NOTE — ASSESSMENT & PLAN NOTE
Patient has hyperlipidemia.  Patient will continue rosuvastatin 40 mg daily and Zetia 10 mg daily.  Again, the patient needs to have his lipid panel checked as well as LFTs.  I urged him to get his blood work done.

## 2025-02-20 NOTE — ASSESSMENT & PLAN NOTE
Patient has hypertension.  His blood pressure is currently well-controlled.  I will have the patient continue atenolol 50 mg daily and lisinopril-hydrochlorothiazide 20/12.5 daily.  I advised the patient that I need to get his blood work done.  With the type of medication he is on, his potassium, BUN and creatinine need to be followed.  I have been with [facility] since .  He has not had any blood work done during that entire time.  All the blood work that I have ordered has  and it has been reordered and .  I asked him to make an appoint to get it done and suggest that he get it done Saturday morning.

## 2025-02-20 NOTE — ASSESSMENT & PLAN NOTE
Patient has a history of malignant melanoma of the face.  We again discussed use of sunscreens and sun protective clothing.

## 2025-08-02 DIAGNOSIS — I10 ESSENTIAL HYPERTENSION, BENIGN: ICD-10-CM

## 2025-08-02 DIAGNOSIS — E78.2 MIXED HYPERLIPIDEMIA: ICD-10-CM

## 2025-08-04 RX ORDER — ATENOLOL 50 MG/1
50 TABLET ORAL DAILY
Qty: 90 TABLET | Refills: 0 | Status: SHIPPED | OUTPATIENT
Start: 2025-08-04

## 2025-08-04 RX ORDER — EZETIMIBE 10 MG
10 TABLET ORAL DAILY
Qty: 90 TABLET | Refills: 0 | Status: SHIPPED | OUTPATIENT
Start: 2025-08-04

## 2025-08-04 RX ORDER — ROSUVASTATIN CALCIUM 40 MG/1
TABLET, FILM COATED ORAL
Qty: 90 TABLET | Refills: 0 | Status: SHIPPED | OUTPATIENT
Start: 2025-08-04

## 2025-08-20 ENCOUNTER — OFFICE VISIT (OUTPATIENT)
Dept: FAMILY MEDICINE CLINIC | Facility: CLINIC | Age: 45
End: 2025-08-20
Payer: COMMERCIAL

## 2025-08-20 VITALS
OXYGEN SATURATION: 98 % | HEIGHT: 69 IN | DIASTOLIC BLOOD PRESSURE: 78 MMHG | WEIGHT: 266.3 LBS | HEART RATE: 79 BPM | TEMPERATURE: 97.7 F | BODY MASS INDEX: 39.44 KG/M2 | SYSTOLIC BLOOD PRESSURE: 133 MMHG

## 2025-08-20 DIAGNOSIS — Z00.00 ANNUAL PHYSICAL EXAM: Primary | ICD-10-CM

## 2025-08-20 DIAGNOSIS — Z79.899 ENCOUNTER FOR LONG-TERM (CURRENT) USE OF MEDICATIONS: ICD-10-CM

## 2025-08-20 DIAGNOSIS — Z11.59 NEED FOR HEPATITIS C SCREENING TEST: ICD-10-CM

## 2025-08-20 DIAGNOSIS — C76.0 MALIGNANT NEOPLASM OF HEAD, FACE AND NECK (HCC): ICD-10-CM

## 2025-08-20 DIAGNOSIS — E78.2 MIXED HYPERLIPIDEMIA: ICD-10-CM

## 2025-08-20 PROCEDURE — 99396 PREV VISIT EST AGE 40-64: CPT | Performed by: FAMILY MEDICINE
